# Patient Record
Sex: FEMALE | Race: WHITE | Employment: FULL TIME | URBAN - METROPOLITAN AREA
[De-identification: names, ages, dates, MRNs, and addresses within clinical notes are randomized per-mention and may not be internally consistent; named-entity substitution may affect disease eponyms.]

---

## 2018-08-13 LAB
HBV SURFACE AG SERPL QL IA: NEGATIVE
HIV 1+2 AB+HIV1 P24 AG SERPL QL IA: NEGATIVE
RUBELLA ABY IGG: NORMAL

## 2019-01-24 ENCOUNTER — ALLIED HEALTH/NURSE VISIT (OUTPATIENT)
Dept: EDUCATION SERVICES | Facility: CLINIC | Age: 29
End: 2019-01-24
Payer: COMMERCIAL

## 2019-01-24 DIAGNOSIS — O24.419 GESTATIONAL DIABETES: Primary | ICD-10-CM

## 2019-01-24 PROCEDURE — G0108 DIAB MANAGE TRN  PER INDIV: HCPCS

## 2019-01-24 RX ORDER — BLOOD-GLUCOSE METER
1 EACH MISCELLANEOUS ONCE
Qty: 1 KIT | Refills: 0 | Status: SHIPPED | OUTPATIENT
Start: 2019-01-24 | End: 2019-05-06

## 2019-01-24 NOTE — PROGRESS NOTES
Diabetes Self-Management Education & Support  Gestational Diabetes Self-Management Education & Support    SUBJECTIVE/OBJECTIVE:  Presents for education related to gestational diabetes.    Accompanied by: Self  Diabetes management related comments/concerns: Will the baby be okay?    Cultural Influences/Ethnic Background:  American    Estimated Date of Delivery: 19    1 hour OGTT  150  3 hour OGTT    Fasting  95  1 hour  174  2 hour  157  3 hour  122    Lifestyle and Health Behaviors:  Pre-pregnancy weight (lbs): 190 (29 weeks now)  Exercise:: Currently not exercising  Meals include: Breakfast, Lunch, Dinner, Snacks  Beverages: Water  Cultural/Sabianist diet restrictions?: No  Biggest challenges to healthy eating: None  Pre- vitamin?: Yes  Supplements?: No  Experiencing nausea?: No    Was doing weight watchers and had lost 40# prior to pregnancy.     Breakfast: english muffin and eggs and a small juliet and water  Snack: almonds and string cheese and carrots  Lunch: more protein and less carb (beef stew)  Snack: similar to morning or might be an apple  Dinner: protein usually (chicken) with salad and rice  Snack: nothing (not usually hungry at this time)    Healthy Coping:  Emotional response to diabetes: Ready to learn  Informal Support system:: Spouse  Stage of change: PREPARATION (Decided to change - considering how)    Current Management:  Taking medications for gestational diabetes?: No  Difficulty affording diabetes management supplies?: No    ASSESSMENT:  Needs assistance with meal plan and BG monitoring.     INTERVENTION:  Patient was instructed on One Touch Verio Flex meter and was able to provide an accurate return demonstration. Patient's blood glucose reading today was 120 mg/dL.    Educational topics covered today:  GDM diagnosis, pathophysiology, Risks and Complications of GDM, Means of controlling GDM, Using a Blood Glucose Monitor, Blood Glucose Goals, Logging and Interpreting Glucose  Results, Ketone Testing, When to Call a Diabetes Educator or OB Provider, Healthy Eating During Pregnancy, Counting Carbohydrates, Meal Planning for GDM, and Physical Activity    Educational materials provided today:   Artur Understanding Gestational Diabetes  GDM Log Book  Sharps Disposal  Care After Delivery  One Touch Verio Flex meter kit    Pt verbalized understanding of concepts discussed and recommendations provided today.     PLAN:  Check glucose 4 times daily, before breakfast and 1 hour after each meal.     Check Ketones daily for one week, if negative, reduce testing to once a week.     Physical activity recommended: as able.    Meal plan: 2-3 carbs at breakfast, 3-4 carbs at lunch, 3-4 carbs at supper, 1-2 carbs at 3 snacks a day.  Follow consistent CHO meal plan, eat CHO and protein/fat at all meals/snacks.    Call/e-mail/Break Mediahart message diabetes educator if 3 or more blood sugars are above the goal in 1 week, if ketones are positive, or with questions/concerns.    Yecenia Herron RD LD CDE    Time Spent: 90 minutes  Encounter Type: Individual

## 2019-02-08 ENCOUNTER — PATIENT OUTREACH (OUTPATIENT)
Dept: EDUCATION SERVICES | Facility: CLINIC | Age: 29
End: 2019-02-08

## 2019-02-08 NOTE — PROGRESS NOTES
Called pt to try to r/s her missed GDM follow up appointment from this week. No answer so left vm with our c/b number to schedule. Will call her clinic to update them.    Yecenia Herron RD LD CDE

## 2019-02-13 ENCOUNTER — ALLIED HEALTH/NURSE VISIT (OUTPATIENT)
Dept: EDUCATION SERVICES | Facility: CLINIC | Age: 29
End: 2019-02-13
Payer: COMMERCIAL

## 2019-02-13 DIAGNOSIS — O24.419 GESTATIONAL DIABETES: Primary | ICD-10-CM

## 2019-02-13 PROCEDURE — G0108 DIAB MANAGE TRN  PER INDIV: HCPCS

## 2019-02-13 NOTE — Clinical Note
Not sure who is covering triage tomorrow but I'm faxing my note to Dr. Campbell right now requesting insulin start and just wanted to keep it on the triage radar. Thanks for your help!Jeannine

## 2019-02-13 NOTE — PATIENT INSTRUCTIONS
1. Check glucose 4 times daily, before breakfast daily and 1 hour after each meal, as recommended.    2. Check ketones daily or once a week after they have been negative for 7 days in a row. If ketones are positive, let your diabetes educator know and continue to check daily until they are negative for 7 days in a row.    3. Continue with recommended physical activity.    4. Continue to follow recommended meal plan: 2-3 carbs at breakfast, 3-4 carbs at lunch, 3-4 carbs at supper, 1-2 carbs at snacks.  Follow consistent CHO meal plan, eat CHO and protein/fat at all meals/snacks.    5. Follow-up with OB doctor as recommended.    6. Call/e-mail diabetes educator if 3 or more blood sugars are above the goal in 1 week or if ketones are positive.    Jeannine or another Diabetes Educator will call with an update once we've heard back from your OB on insulin orders      AFTER YOU DELIVER:  - Continue with healthy eating and physical activity to get back to your pre-pregnancy weight.   - Have a follow-up 2-hour Glucose Tolerance Test at your 6-week post-partum check-up.   - Have your fasting blood sugar checked once a year.  - Plan ahead for future pregnancies - eat healthy, keep active, work with your doctor to check for gestational diabetes early on in the pregnancy and check blood sugars as recommended by your doctor.

## 2019-02-13 NOTE — PROGRESS NOTES
Diabetes Self-Management Education & Support  Gestational Diabetes Self-Management Education & Support    SUBJECTIVE/OBJECTIVE:  Presents for education related to gestational diabetes.    Accompanied by: (P) Self  Diabetes management related comments/concerns: (P) Will the baby be okay?    Cultural Influences/Ethnic Background:  American    There were no vitals taken for this visit.    Weight gain not assessed at 31 weeks gestation.    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   Meals include: (P) Breakfast, Lunch, Dinner, Snacks  Beverages: (P) Water  Cultural/Uatsdin diet restrictions?: (P) No  Biggest challenges to healthy eating: (P) None  Pre- vitamin?: (P) Yes  Supplements?: (P) No  Experiencing nausea?: (P) No        Healthy Coping:  Emotional response to diabetes: (P) Ready to learn  Informal Support system:: (P) Spouse  Stage of change: (P) PREPARATION (Decided to change - considering how)    Current Management:  Taking medications for gestational diabetes?: (P) No  Difficulty affording diabetes management supplies?: (P) No    ASSESSMENT:  Ketones: trace to small.   Fasting blood glucoses: 0% in target.  After breakfast: 46% in target.  After lunch: 93% in target.  After dinner: 79% in target.    Patient was able to verbalize that she sometimes ate too large of portions or made a poor food choice and that caused higher post-prandial blood sugars. She states she does not know why her morning blood sugars are always running well above goal. She is eating a bedtime snack only on occasion.     INTERVENTION:  Educational topics covered today:  What to expect after delivery, Future testing for Type 2 diabetes (2 hour OGTT at 6 week post-partum check-up and annual fasting blood glucose level), Risk of GDM and planning ahead for future pregnancies,  Recommended lifestyle interventions for reducing the risk of Type 2 Diabetes, When to Call a Diabetes Educator or OB Provider    Insulin injection technique taught using a Pen for NPH at bedtime. Patient verbalized understanding and was able to perform an accurate return demonstration of injection technique.  Discussed mixing insulin, storage, sharps, new needle each use, site selection, action of insulin and hypoglycemia signs, symptoms and treatment.      Patient states she is very fearful of needles and asked if there was a pill or another option so she did not have to take a shot. Informed her that insulin is what is generally considered safe and what we recommend during pregnancy for treatment of hyperglycemia. Recommended she discussed other options with her OB provider but if she did pursue an off-label use of oral medication, that we would no longer be able to follow her. Also encouraged her to consider asking her partner to help her with insulin injections as another option to avoid having to poke herself.       Educational Materials provided today:  Artur Preventing Diabetes    Provided Regine voucher good for 1 box of insulin pens    PLAN:  Check glucose 4 times daily.  Check ketones once a week when readings are consistently negative.   Continue with recommended physical activity.  Continue to follow recommended meal plan: 2-3 carbs at breakfast, 3-4 carbs at lunch, 3-4 carbs at supper, 1-2 carbs at snacks. Encouraged patient to focus on getting in HS snack and avoiding going over carb limits at meals to improve blood sugar.   Follow consistent CHO meal plan, eat CHO and protein/fat at all meals/snacks.  Recommend NPH at bedtime - 10 unit(s) (0.12 unit(s)/kg pre-pregnancy weight)  Send in blood sugar log on Monday or 3 days after insulin start     Call/e-mail/Survey[employer]ap message diabetes educator if 3 or more blood sugars are above the goal in 1 week or if ketones are positive.    Jeannine Ward, MART, LD    Time Spent: 40 minutes  Encounter Type: Individual    Any diabetes medication dose changes were made via the CDE Protocol and Collaborative Practice Agreement with the patient's OB/GYN provider. A copy of this encounter was shared with the provider.

## 2019-02-14 ENCOUNTER — PATIENT OUTREACH (OUTPATIENT)
Dept: EDUCATION SERVICES | Facility: CLINIC | Age: 29
End: 2019-02-14

## 2019-02-14 DIAGNOSIS — O24.419 GESTATIONAL DIABETES: Primary | ICD-10-CM

## 2019-02-14 NOTE — PROGRESS NOTES
Dr Iraida Strickland called to say she just had an appointment with pt who is agreeing to start insulin. MD gave verbal order to start 10 units at bedtime. Please send script to pharmacy.

## 2019-02-14 NOTE — PROGRESS NOTES
Called pt's OB clinic and talked to a triage nurse. Updated her that pt's fasting blood sugars are all high and that we recommend insulin to be started. However, pt is very fearful of needles and wondering about an oral option (see CDE note from yesterday for details). Pt is seeing her OB this morning and nurse will update her on our recommendations. Clinic will then call us back to update us on if the patient will do insulin or an oral medication so we can help order the insulin if needed.     Yecenia Herron, MART LD CDE

## 2019-02-14 NOTE — PROGRESS NOTES
Called patient and left voicemail informing patient that prescriptions were sent in this morning and she should likely be able to pick them up this afternoon and get started this evening. Informed her that she will need to use orange voucher (Regine voucher) provided yesterday to pay for pens. Requested patient e-mail blood sugar log on Monday. Provided Diabetes Ed triage line if patient has any further questions.     Jeannine Ward RD, LD

## 2019-02-14 NOTE — PROGRESS NOTES
NPH insulin ordered to pt's preferred pharmacy - 10 unit(s) at bedtime. Ordered insulin pen needles too (shortest available).    CDE that saw her yesterday will call to update her on orders placed.     Will request she e-mail us on Monday 2/18 for follow up.     MART Marroquin CDE

## 2019-02-18 ENCOUNTER — PATIENT OUTREACH (OUTPATIENT)
Dept: EDUCATION SERVICES | Facility: CLINIC | Age: 29
End: 2019-02-18
Payer: COMMERCIAL

## 2019-02-18 DIAGNOSIS — O24.419 GESTATIONAL DIABETES: ICD-10-CM

## 2019-02-18 NOTE — PROGRESS NOTES
Gestational Diabetes Follow-up    Subjective/Objective:    Stacey Bhandari sent in blood glucose log for review. Last date of communication was: 2/14/19.    Gestational diabetes is being managed with diet, activity and medications    Taking diabetes medications:   yes:     Diabetes Medication(s)     Insulin Sig    insulin isophane human (HUMULIN N KWIKPEN) 100 UNIT/ML injection Inject 10 units at bedtime.          Estimated Date of Delivery: 4/10/19    BG/Food Log:       Assessment:  Called clinic [facility] for updated body weight:  243 lbs (110.4 kg) 2/14/19    Current insulin dose is not high enough to bring fasting blood sugars down.  Patient may need twice daily insulin, but at this point anticipate post breakfast and lunch readings will come down once fasting readings are in target.  If post dinner elevations continue, recommend starting mealtime insulin.     Patient would benefit from a 0.1 unit(s)/kg insulin dose increase to 0.22 unit(s)/kg.  Given pattern of elevated lunch readings would recommend starting small dose of pre-breakfast Humulin to offer some daytime coverage.      Ketones: negative-small.   Fasting blood glucoses: 0% in target.  After breakfast: 25% in target.  After lunch: 75% in target.  After dinner: 50% in target.    Plan/Response:  Recommend increase to insulin - Humulin 0-0-0-10 --> 4-0-0-20.      Request orders to start mealtime insulin (novolog/humalog) if post meal readings elevations continue, recommend starting mealtime insulin up to 5 units per meal if 50% or more post meal readings are above target once fasting blood sugars are at goal.    Faxed insulin increase orders to [facility].    Liset Fabian MS, RD, LD, CDE      Any diabetes medication dose changes were made via the CDE Protocol and Collaborative Practice Agreement with the patient's OB/GYN provider. A copy of this encounter was shared with the provider.    E-mail reply to patient 2/18/19:  Merrick Ibarra,    Tim for sending  in your readings.  I think we started you on not quite enough insulin.  I have requested a larger dose increase from Dr. Campbell, but I won't hear back from her until tomorrow.    For tonight can you please increase to 12 units at bedtime.  Then I will email you tomorrow once the new insulin dose is approved.      Sorry about the delay!      Liset Fabian MS, RD, LD, CDE

## 2019-02-19 NOTE — PROGRESS NOTES
Fax received from [facility]:       E-mail to patient:  Merrick Ibarra,    Ok so for your bedtime dose tonight please increase to 20 units, this should better land your fasting readings in the 85-95 range.  Also due to the longer action time of the insulin any post breakfast readings that are in the 140-160s should also come down.      Then to cover some of the elevations during the day I think we should also add in some insulin during the day.  For now please take 4 units before breakfast.  If you just had breakfast you could take some now instead of waiting until tomorrow.    If after today's insulin increase you are still seeing some high after meal readings we can add in a 2nd kind of insulin, but it's best to see how your blood sugars respond with the kind of insulin you're currently on with the higher doses.    Can you send in again on Friday after breakfast?    Thanks!      Liset Fabian MS, RD, LD, CDE

## 2019-02-22 ENCOUNTER — PATIENT OUTREACH (OUTPATIENT)
Dept: EDUCATION SERVICES | Facility: CLINIC | Age: 29
End: 2019-02-22
Payer: COMMERCIAL

## 2019-02-22 DIAGNOSIS — O24.419 GESTATIONAL DIABETES: ICD-10-CM

## 2019-02-22 PROCEDURE — 99207 ZZC NO CHARGE LOS: CPT

## 2019-02-22 NOTE — PROGRESS NOTES
Gestational Diabetes Follow-up    Subjective/Objective:    Stacey BARAKAT Kaylanci sent in blood glucose log for review. Last date of communication was: 2/18/19.    Gestational diabetes is being managed with medications    Taking diabetes medications:   yes:     Diabetes Medication(s)     Insulin       insulin isophane human (HUMULIN N KWIKPEN) 100 UNIT/ML injection    Inject 4 units before breakfast and 20 units at bedtime.          Estimated Date of Delivery: Data Unavailable    BG/Food Log:       Hello!    Here are my numbers since upping my dosage in the 18th.     Let me know what you would like me to do... i promise I will try harder to get that bedtime snack in there I noticed the number looks like it went down the night I did that. Let me know what else I should do.    Thank you,  Stacey     Assessment:    Ketones: N-T.   Fasting blood glucoses: 0% in target.  After breakfast: 0% in target.  After lunch: 100% in target.  After dinner: 33% in target.    Per CDE note on 2/18, last updated body weight is 243 lbs (110.4 kg).     Called pt to confirm when she is taking her doses and her insulin injection technique. No answer so left vm for pt to call us back on our triage line. Also sent e-mail regarding this (see below).     Called OB clinic as well as I would recommend increasing her bedtime NPH further then our protocol allows if she is doing her insulin correctly. Discussed with RN who will talk to OB and call us back on our triage line with a verbal okay to increase after she talks to the OB. UPDATE - RN called back that Dr Campbell gave us a verbal okay to increase to 30 unit(s) NPH at bedtime and they will update their chart as well. Already have the okay from Dr Campbell to start 5 unit(s) meal time insulin if needed.     Plan/Response:  Recommend increase to insulin - NPH 4-0-0-20 --> NPH 4-0-0-30. Verbal okay from pt's OB Dr Campbell to do this.   If doing proper injection technique then recommend to start 5  "unit(s) novolog/humalog with breakfast. Asked pt to check with insurance to see which one is preferred.   Follow up on Monday after the post breakfast BG check.   E-mail sent as well as vm left on pt's phone to confirm she is doing proper insulin injection technique prior to ordering the short acting insulin with meals.     CDE Reply:   Merrick Ibarra,    I tried calling you this morning as I have just a couple questions for you but e-mail might be easier.  First, Thank you for sending in your numbers! Very helpful.    My question for you is are you taking 24 units insulin at bedtime or taking 20 units at bedtime and 4 units before breakfast?     My next question is related to how you are giving it. I just want to make sure you are following proper technique and receiving the full dose. Just a reminder to always use a new needle for an injection, \"test\" the needle with 2 units and make sure you see insulin come out before injecting yourself, and make sure you then PUSH the button on the end of the insulin pen all the way down to zero after you insert the needle into your body. Then just hold it there for about 5 seconds before removing the needle.  Also, make sure you gently blend your insulin before each dose (roll between your fingers for about 10 seconds).     IF You are taking 4 units before breakfast and 20 units before bed AND you feel confident you are doing the injection properly then we should increase your dose again.  I talked to your OB already and we will then increase to 30 units NPH at bedtime (and continue 4 units before breakfast). In this case, We also should start a new insulin with breakfast because it seems your blood sugar is just rising too much at this meal so we should start 5 units of a short acting insulin with this meal.     I would like to first hear from you on these questions before starting a new insulin though and increasing your current dose. So if you can get back to us via phone or " email before 2 pm today that would be very helpful.     Also, if you can call your insurance to see if they prefer either Novolog or Humalog that would be helpful so we order the correct one for you (this is the short acting insulin for meals)!    Lastly, your bedtime snack definitely does help your morning number so I would really try to get that each night.     Thank you so much and my apologies for such a long message!    Hope to hear from you soon.  Thanks!    MART Marroquin CDE      Any diabetes medication dose changes were made via the CDE Protocol and Collaborative Practice Agreement with the patient's OB/GYN provider. A copy of this encounter was shared with the provider.

## 2019-02-22 NOTE — PROGRESS NOTES
Pt sent e-mail reply:    Hello!    Ok so...    1. 20 units before bed and 4 after breakfast is what I have been doing.   2. I think you said 4 before now? Should I be doing 4 units before breakfast instead of after?   3. I have not been holding the needle in there for 5 seconds after... I will make sure to do that.  4. Yes I will have the bedtime snack today and see how that goes.     CDE response:     Thanks for the reply Stacey!    Great question! You should be doing 4 units NPH BEFORE breakfast (not after).      Since you are currently taking it after your meal, let's start with just adjusting that dose to before your meal and continue 4 units. We will hold off on the short acting insulin for now to see if that helps. If your after breakfast numbers continue to be too high this weekend with that change then we need to start some additional insulin at that meal.     It sounds like you must be doing the rest of the injection steps accurately but make sure you hold the pen button down and keep it under your skin after the injection for at least 5 seconds. This is to ensure you receive the full dose.     Let's plan to increase your insulin at bed tonight to the 30 units (and have that bedtime snack :))    Please let me know if you have any questions on this plan. Otherwise please send in your numbers again on Monday after you check your fasting blood sugar in the morning.     Have a nice weekend Stacey! Look forward to hearing from you next week.       MART Marroquin CDE

## 2019-02-22 NOTE — PROGRESS NOTES
Update:     Sent pt another e-mail reply after thinking about her numbers some more. Even if the NPH at bedtime with the increased dose brings down her FBS to target, her post breakfast rise that she is having will still keep her post meal numbers above target. Therefore, will also order the 5 unit(s) humalog with this meal.     Hi there again,     After reviewing the plan a bit more I just don't think the 4 units in the morning will touch that breakfast meal rise since it does not start working right away. So I am going to order a short acting insulin this afternoon for you as well and that dose will be 5 units prior to breakfast (take 5-10 min before breakfast). You will also still take the 4 units of NPH insulin prior to breakfast (2 injections I know. I am sorry!). I still want you to update us on Monday please! Thanks and let me know if you have questions. Sorry for so many messages today!    Yecenia Herron, MART MCCAULEY CDE

## 2019-02-28 ENCOUNTER — PATIENT OUTREACH (OUTPATIENT)
Dept: EDUCATION SERVICES | Facility: CLINIC | Age: 29
End: 2019-02-28
Payer: COMMERCIAL

## 2019-02-28 DIAGNOSIS — O24.419 GESTATIONAL DIABETES: ICD-10-CM

## 2019-02-28 NOTE — PROGRESS NOTES
Gestational Diabetes Follow-up    Subjective/Objective:    Stacey BARAKAT Zackekci sent in blood glucose log for review. Last date of communication was: 2/22.    Gestational diabetes is being managed with medications    Taking diabetes medications:   yes:     Diabetes Medication(s)     Insulin       insulin isophane human (HUMULIN N KWIKPEN) 100 UNIT/ML injection    Inject 4 units before breakfast and 30 units at bedtime.     insulin lispro (HUMALOG PEN) 100 UNIT/ML pen    Inject 5 units  with breakfast meal daily.        NOTE: It appears she took the recommended 30 units for only 2 nights, then increased to 34 units (no documentation of rec to increase to 34).     Estimated Date of Delivery: 4/11  BG/Food Log:         Assessment:(since insulin increase)    Ketones: N.   Fasting blood glucoses: 0% in target.  After breakfast: 100% in target.  After lunch: 80% in target.  After dinner: 60% in target.    Plan/Response:  Recommend increase to insulin - Increase NPH from 4-0-0-34 --> 5-0-0-39 (<20% increase in TDD, 15% at HS).  Continue 5 units Humalog before breakfast.     Requested pre- and post- numbers with each meal for now.     CDE reply:  Merrick Ibarra,  Thank you for the update! You are doing just great with record keeping! Very easy to see how your body is using the insulin and food.     It looks like you have some powerful mom hormones J Great for baby, but hard on blood sugars.     ~ Frist, lets continue to work to get the morning number down. Please increase your bedtime NPH insulin (from 34) to 39 units.   ~ For now, also please increase the morning NPH insulin (from 4) to 5 units.   ~ Finally, I believe you are taking 5 units of Humalog insulin right before breakfast- that is working great! Keep it the same.     One request, I know it s a pain- if you could check before and 1 hour after each meal for a week or so, that would be ideal.   Comparing the numbers before and after lunch and dinner can help to show whether  you just need more NPH in the morning, or if your body is really asking for some Humalog right before each meal.   Your body is SO sensitive to everything right now! With those couple of higher numbers after dinner, it would be good to know what s up.     To check before and after, just put it in the box that says  after lunch  (or dinner) like: 94/141 (before/after)    If you can make the insulin increases and send in again Monday, Liset can take a look and see how that works ;)    Thank you Stacey!   Genevieve Marlow RD, LD, CDE    Any diabetes medication dose changes were made via the CDE Protocol and Collaborative Practice Agreement with the patient's OB/GYN provider.

## 2019-03-06 ENCOUNTER — PATIENT OUTREACH (OUTPATIENT)
Dept: EDUCATION SERVICES | Facility: CLINIC | Age: 29
End: 2019-03-06
Payer: COMMERCIAL

## 2019-03-06 DIAGNOSIS — O24.419 GESTATIONAL DIABETES: ICD-10-CM

## 2019-03-06 LAB — GROUP B STREP PCR: POSITIVE

## 2019-03-06 NOTE — PROGRESS NOTES
Gestational Diabetes Follow-up    Subjective/Objective:    Stacey BARAKAT Zackekci sent in blood glucose log for review. Last date of communication was: 2/28/19.    Gestational diabetes is being managed with medications    Taking diabetes medications:   yes:     Diabetes Medication(s)     Insulin       insulin isophane human (HUMULIN N KWIKPEN) 100 UNIT/ML injection    Inject 5 units before breakfast and 39 units at bedtime.     insulin lispro (HUMALOG PEN) 100 UNIT/ML pen    Inject 5 units  with breakfast meal daily.          Estimated Date of Delivery: Data Unavailable    BG/Food Log:       Assessment:    Ketones: neg.   Fasting blood glucoses: 43% in target.  After breakfast: 66% in target (believe she is checking 1 hour after breakfast instead of 2 hours)  After lunch: 100% in target.  After dinner: 83% in target.    Patient is only checking at 1 hour after breakfast, but should move to 2 hours since she is taking short acting insulin at that time.     Plan/Response:  Recommend increase to insulin - NPH 4-0-0-39 ---> 4-0-0-45 (16% increase).   Will not increase Humalog today, however want to see her numbers again Monday for review on breakfast numbers once she is checking at 2 hours post meal    Hi Stacey,    Thanks for the update! Overall, I think the 4 units of NPH in the morning is working well for you, so no changes to that dose! Let's make the following changes today:  1. Increase your bedtime NPH to 45 units (from 39)  2. Can you start checking your after breakfast number at 2 hours instead of 1? I know that can sound picky, but since you are taking short acting insulin at that time, the 2 hour check is the best one to see how that insulin is working for you. Just place an * after your breakfast number so that we know you checked at 2 hours. For now, let's keep that HUmalog dose at 5 units before breakfast.  3. You can reduce your ketone checks to once weekly now   4. Email us Monday morning for another  review.    Thanks, and let me know if you have any questions!    Kavitha Guevara RD LD CDE    Any diabetes medication dose changes were made via the CDE Protocol and Collaborative Practice Agreement with the patient's OB/GYN provider. A copy of this encounter was shared with the provider.

## 2019-03-13 ENCOUNTER — PATIENT OUTREACH (OUTPATIENT)
Dept: EDUCATION SERVICES | Facility: CLINIC | Age: 29
End: 2019-03-13

## 2019-03-13 DIAGNOSIS — O24.419 GESTATIONAL DIABETES: Primary | ICD-10-CM

## 2019-03-13 NOTE — PROGRESS NOTES
Gestational Diabetes Follow-up    Subjective/Objective:    Stacey YUVAL Bhandari sent in blood glucose log for review. Last date of communication was: 3/6/19.    Gestational diabetes is being managed with medications    Taking diabetes medications:   yes:     Diabetes Medication(s)     Insulin       insulin isophane human (HUMULIN N KWIKPEN) 100 UNIT/ML injection    Inject 5 units before breakfast and 45 units at bedtime.     insulin lispro (HUMALOG PEN) 100 UNIT/ML pen    Inject 5 units  with breakfast meal daily.          Estimated Date of Delivery: Data Unavailable    BG/Food Log:       Assessment:    Ketones: neg.   Fasting blood glucoses: 83% in target.  After breakfast: 100% in target.  After lunch: 100% in target.  After dinner: 80% in target.    Plan/Response:  No changes in the patient's current treatment plan.  Follow-up on Monday.    Merrick Ibarra,    Thanks for the update! I think we can keep your insulin doses the same for right now - things are looking good! Your fasting this morning was a little out of target, but it is not enough for me to want to make a dose change yet. If you do notice that your fasting number is starting to stay >95 by Friday, I am ok with you increasing to 50 units before bed. Otherwise, just keep logging your blood sugars and we ll take another look at things on Monday morning.    Have a great week!  MART Hand CDE    Any diabetes medication dose changes were made via the CDE Protocol and Collaborative Practice Agreement with the patient's OB/GYN provider. A copy of this encounter was shared with the provider.

## 2019-03-18 ENCOUNTER — PATIENT OUTREACH (OUTPATIENT)
Dept: EDUCATION SERVICES | Facility: CLINIC | Age: 29
End: 2019-03-18
Payer: COMMERCIAL

## 2019-03-18 DIAGNOSIS — O24.419 GESTATIONAL DIABETES: ICD-10-CM

## 2019-03-18 NOTE — PROGRESS NOTES
E-mail reply from patient:  I will need a refill of the insulin I m taking at night. I am almost out. Also, I am needing more of the lancets and test strips as well.     Also, I haven t been taking anything during the day. Only the 45 at night. Was I supposed to be taking something else too? My blood sugar seems to be pretty good during the day it s just the fasting numbers that seem off.     E-mail reply to patient:  Hi Stacey,    Thanks for letting me know about your supplies.  I just sent a 1 month supply for strips and lancets to the pharmacy.      We are not authorized to refill insulin unfortunately.  I would recommend requesting a new order today first from the pharmacy.  The should then request a refill from Dr. Campbell.  We have been updating the pharmacy as we adjust your dose so you should be able to get a refill for the 50 units.  If you aren't able to get through to the pharmacy I would call Dr. Henry's[facility] today to let them know you'll need a refill.    If your insurance doesn't cover Humulin Kwikpens, we may need to have you switch to the vial and syringe for the remainder of the pregnancy.      Sorry about the confusion on the daytime insulin.  Typically when people take mealtime insulin we have them check 2 hours after the meal, and since we ordered the mealtime insulin a while back but then had you hold of on taking it, I just looked through things too fast!  Continue just taking the Humulin at bedtime, but increase to 50 units.    Let me know tomorrow if you have any issues getting a refil.      Liset Fabian MS, RD, LD, CDE

## 2019-03-18 NOTE — PROGRESS NOTES
Gestational Diabetes Follow-up    Subjective/Objective:    Stacey BARAKAT Ipekci sent in blood glucose log for review. Last date of communication was: 3/13/19.    Gestational diabetes is being managed with diet, activity and medications    Taking diabetes medications:   yes:     Diabetes Medication(s)     Insulin       insulin isophane human (HUMULIN N KWIKPEN) 100 UNIT/ML injection    Inject 5 units before breakfast and 45 units at bedtime.     insulin lispro (HUMALOG PEN) 100 UNIT/ML pen    Inject 5 units  with breakfast meal daily.          Estimated Date of Delivery: 4/11/19    BG/Food Log:       Assessment:  Fasting and post breakfast readings increasing to be above target.  Anticipate post breakfast readings will come back into target once fasting reading is improved.     Ketones: negative.   Fasting blood glucoses: 40% in target.  After breakfast: 80% in target.  After lunch: 100% in target.  After dinner: 100% in target.    Plan/Response:  Recommend increase to insulin - Humulin 5-0-0-45 --> 5-0-0-50.  If fasting readings are in target, and post breakfast readings increase above target, then increase Humalog 5-0-0-0- --> 6-0-0-0  Follow up 3/21/19    Liset Fabian MS, RD, LD, CDE      Any diabetes medication dose changes were made via the CDE Protocol and Collaborative Practice Agreement with the patient's OB/GYN provider. A copy of this encounter was shared with the provider.    E-mail reply to patient:  Merrick Ibarra,    Thanks for sending in your readings.  Please increase to 50 units at bedtime of Humulin.  If you fasting readings come back into 95 or less and your after breakfast readings are still above 120, then increase to 6 units of the Humalog.    Can you send in again on Thursday 3/21/19.    Thanks!      Liset Fabian MS, RD, LD, CDE

## 2019-03-22 ENCOUNTER — HOSPITAL ENCOUNTER (INPATIENT)
Facility: CLINIC | Age: 29
LOS: 3 days | Discharge: HOME | End: 2019-03-25
Attending: OBSTETRICS & GYNECOLOGY | Admitting: OBSTETRICS & GYNECOLOGY
Payer: COMMERCIAL

## 2019-03-22 LAB
ABO + RH BLD: NORMAL
ABO + RH BLD: NORMAL
GLUCOSE BLDC GLUCOMTR-MCNC: 133 MG/DL (ref 70–99)
SPECIMEN EXP DATE BLD: NORMAL

## 2019-03-22 PROCEDURE — 0064U ANTB TP TOTAL&RPR IA QUAL: CPT | Performed by: OBSTETRICS & GYNECOLOGY

## 2019-03-22 PROCEDURE — 3E0P7VZ INTRODUCTION OF HORMONE INTO FEMALE REPRODUCTIVE, VIA NATURAL OR ARTIFICIAL OPENING: ICD-10-PCS | Performed by: OBSTETRICS & GYNECOLOGY

## 2019-03-22 PROCEDURE — 25000131 ZZH RX MED GY IP 250 OP 636 PS 637: Performed by: OBSTETRICS & GYNECOLOGY

## 2019-03-22 PROCEDURE — 00000146 ZZHCL STATISTIC GLUCOSE BY METER IP

## 2019-03-22 PROCEDURE — 86901 BLOOD TYPING SEROLOGIC RH(D): CPT | Performed by: OBSTETRICS & GYNECOLOGY

## 2019-03-22 PROCEDURE — 25000132 ZZH RX MED GY IP 250 OP 250 PS 637: Performed by: OBSTETRICS & GYNECOLOGY

## 2019-03-22 PROCEDURE — 10907ZC DRAINAGE OF AMNIOTIC FLUID, THERAPEUTIC FROM PRODUCTS OF CONCEPTION, VIA NATURAL OR ARTIFICIAL OPENING: ICD-10-PCS | Performed by: OBSTETRICS & GYNECOLOGY

## 2019-03-22 PROCEDURE — 86900 BLOOD TYPING SEROLOGIC ABO: CPT | Performed by: OBSTETRICS & GYNECOLOGY

## 2019-03-22 PROCEDURE — 12000000 ZZH R&B MED SURG/OB

## 2019-03-22 PROCEDURE — 3E033VJ INTRODUCTION OF OTHER HORMONE INTO PERIPHERAL VEIN, PERCUTANEOUS APPROACH: ICD-10-PCS | Performed by: OBSTETRICS & GYNECOLOGY

## 2019-03-22 PROCEDURE — 36415 COLL VENOUS BLD VENIPUNCTURE: CPT | Performed by: OBSTETRICS & GYNECOLOGY

## 2019-03-22 RX ORDER — DEXTROSE MONOHYDRATE 25 G/50ML
25-50 INJECTION, SOLUTION INTRAVENOUS
Status: DISCONTINUED | OUTPATIENT
Start: 2019-03-22 | End: 2019-03-23

## 2019-03-22 RX ORDER — FENTANYL CITRATE 50 UG/ML
50-100 INJECTION, SOLUTION INTRAMUSCULAR; INTRAVENOUS
Status: DISCONTINUED | OUTPATIENT
Start: 2019-03-22 | End: 2019-03-23

## 2019-03-22 RX ORDER — OXYTOCIN 10 [USP'U]/ML
10 INJECTION, SOLUTION INTRAMUSCULAR; INTRAVENOUS
Status: DISCONTINUED | OUTPATIENT
Start: 2019-03-22 | End: 2019-03-23

## 2019-03-22 RX ORDER — SODIUM CHLORIDE, SODIUM LACTATE, POTASSIUM CHLORIDE, CALCIUM CHLORIDE 600; 310; 30; 20 MG/100ML; MG/100ML; MG/100ML; MG/100ML
INJECTION, SOLUTION INTRAVENOUS CONTINUOUS
Status: DISCONTINUED | OUTPATIENT
Start: 2019-03-22 | End: 2019-03-23

## 2019-03-22 RX ORDER — TERBUTALINE SULFATE 1 MG/ML
0.25 INJECTION, SOLUTION SUBCUTANEOUS
Status: DISCONTINUED | OUTPATIENT
Start: 2019-03-22 | End: 2019-03-23

## 2019-03-22 RX ORDER — OXYTOCIN/0.9 % SODIUM CHLORIDE 30/500 ML
100-340 PLASTIC BAG, INJECTION (ML) INTRAVENOUS CONTINUOUS PRN
Status: COMPLETED | OUTPATIENT
Start: 2019-03-22 | End: 2019-03-23

## 2019-03-22 RX ORDER — NALOXONE HYDROCHLORIDE 0.4 MG/ML
.1-.4 INJECTION, SOLUTION INTRAMUSCULAR; INTRAVENOUS; SUBCUTANEOUS
Status: DISCONTINUED | OUTPATIENT
Start: 2019-03-22 | End: 2019-03-23

## 2019-03-22 RX ORDER — CARBOPROST TROMETHAMINE 250 UG/ML
250 INJECTION, SOLUTION INTRAMUSCULAR
Status: DISCONTINUED | OUTPATIENT
Start: 2019-03-22 | End: 2019-03-23

## 2019-03-22 RX ORDER — MISOPROSTOL 100 UG/1
25 TABLET ORAL EVERY 4 HOURS PRN
Status: DISCONTINUED | OUTPATIENT
Start: 2019-03-22 | End: 2019-03-23

## 2019-03-22 RX ORDER — IBUPROFEN 400 MG/1
800 TABLET, FILM COATED ORAL
Status: DISCONTINUED | OUTPATIENT
Start: 2019-03-22 | End: 2019-03-23

## 2019-03-22 RX ORDER — NICOTINE POLACRILEX 4 MG
15-30 LOZENGE BUCCAL
Status: DISCONTINUED | OUTPATIENT
Start: 2019-03-22 | End: 2019-03-23

## 2019-03-22 RX ORDER — ACETAMINOPHEN 325 MG/1
650 TABLET ORAL EVERY 4 HOURS PRN
Status: DISCONTINUED | OUTPATIENT
Start: 2019-03-22 | End: 2019-03-23

## 2019-03-22 RX ORDER — SODIUM CHLORIDE 9 MG/ML
INJECTION, SOLUTION INTRAVENOUS CONTINUOUS
Status: DISCONTINUED | OUTPATIENT
Start: 2019-03-22 | End: 2019-03-23

## 2019-03-22 RX ORDER — METHYLERGONOVINE MALEATE 0.2 MG/ML
200 INJECTION INTRAVENOUS
Status: DISCONTINUED | OUTPATIENT
Start: 2019-03-22 | End: 2019-03-23

## 2019-03-22 RX ORDER — DEXTROSE, SODIUM CHLORIDE, SODIUM LACTATE, POTASSIUM CHLORIDE, AND CALCIUM CHLORIDE 5; .6; .31; .03; .02 G/100ML; G/100ML; G/100ML; G/100ML; G/100ML
INJECTION, SOLUTION INTRAVENOUS CONTINUOUS
Status: DISCONTINUED | OUTPATIENT
Start: 2019-03-22 | End: 2019-03-23

## 2019-03-22 RX ORDER — ONDANSETRON 2 MG/ML
4 INJECTION INTRAMUSCULAR; INTRAVENOUS EVERY 6 HOURS PRN
Status: DISCONTINUED | OUTPATIENT
Start: 2019-03-22 | End: 2019-03-23

## 2019-03-22 RX ORDER — PENICILLIN G POTASSIUM 5000000 [IU]/1
5 INJECTION, POWDER, FOR SOLUTION INTRAMUSCULAR; INTRAVENOUS ONCE
Status: COMPLETED | OUTPATIENT
Start: 2019-03-22 | End: 2019-03-23

## 2019-03-22 RX ORDER — OXYCODONE AND ACETAMINOPHEN 5; 325 MG/1; MG/1
1 TABLET ORAL
Status: DISCONTINUED | OUTPATIENT
Start: 2019-03-22 | End: 2019-03-23

## 2019-03-22 RX ADMIN — Medication 25 MCG: at 20:47

## 2019-03-22 RX ADMIN — INSULIN HUMAN 50 UNITS: 100 INJECTION, SUSPENSION SUBCUTANEOUS at 22:08

## 2019-03-23 ENCOUNTER — ANESTHESIA EVENT (OUTPATIENT)
Dept: OBGYN | Facility: CLINIC | Age: 29
End: 2019-03-23
Payer: COMMERCIAL

## 2019-03-23 ENCOUNTER — ANESTHESIA (OUTPATIENT)
Dept: OBGYN | Facility: CLINIC | Age: 29
End: 2019-03-23
Payer: COMMERCIAL

## 2019-03-23 LAB
ALT SERPL W P-5'-P-CCNC: 18 U/L (ref 0–50)
AST SERPL W P-5'-P-CCNC: 22 U/L (ref 0–45)
CREAT SERPL-MCNC: 0.57 MG/DL (ref 0.52–1.04)
ERYTHROCYTE [DISTWIDTH] IN BLOOD BY AUTOMATED COUNT: 13.8 % (ref 10–15)
GFR SERPL CREATININE-BSD FRML MDRD: >90 ML/MIN/{1.73_M2}
GLUCOSE BLDC GLUCOMTR-MCNC: 103 MG/DL (ref 70–99)
GLUCOSE BLDC GLUCOMTR-MCNC: 110 MG/DL (ref 70–99)
GLUCOSE BLDC GLUCOMTR-MCNC: 112 MG/DL (ref 70–99)
GLUCOSE BLDC GLUCOMTR-MCNC: 113 MG/DL (ref 70–99)
GLUCOSE BLDC GLUCOMTR-MCNC: 154 MG/DL (ref 70–99)
GLUCOSE BLDC GLUCOMTR-MCNC: 99 MG/DL (ref 70–99)
HCT VFR BLD AUTO: 37.3 % (ref 35–47)
HGB BLD-MCNC: 12.3 G/DL (ref 11.7–15.7)
MCH RBC QN AUTO: 26.9 PG (ref 26.5–33)
MCHC RBC AUTO-ENTMCNC: 33 G/DL (ref 31.5–36.5)
MCV RBC AUTO: 81 FL (ref 78–100)
PLATELET # BLD AUTO: 214 10E9/L (ref 150–450)
RBC # BLD AUTO: 4.58 10E12/L (ref 3.8–5.2)
T PALLIDUM AB SER QL: NONREACTIVE
WBC # BLD AUTO: 19.7 10E9/L (ref 4–11)

## 2019-03-23 PROCEDURE — 27110038 ZZH RX 271: Performed by: ANESTHESIOLOGY

## 2019-03-23 PROCEDURE — 00000146 ZZHCL STATISTIC GLUCOSE BY METER IP

## 2019-03-23 PROCEDURE — 37000011 ZZH ANESTHESIA WARD SERVICE

## 2019-03-23 PROCEDURE — 72200001 ZZH LABOR CARE VAGINAL DELIVERY SINGLE

## 2019-03-23 PROCEDURE — 88307 TISSUE EXAM BY PATHOLOGIST: CPT | Performed by: OBSTETRICS & GYNECOLOGY

## 2019-03-23 PROCEDURE — 84450 TRANSFERASE (AST) (SGOT): CPT | Performed by: OBSTETRICS & GYNECOLOGY

## 2019-03-23 PROCEDURE — 25000128 H RX IP 250 OP 636: Performed by: OBSTETRICS & GYNECOLOGY

## 2019-03-23 PROCEDURE — 84460 ALANINE AMINO (ALT) (SGPT): CPT | Performed by: OBSTETRICS & GYNECOLOGY

## 2019-03-23 PROCEDURE — 82565 ASSAY OF CREATININE: CPT | Performed by: OBSTETRICS & GYNECOLOGY

## 2019-03-23 PROCEDURE — 36415 COLL VENOUS BLD VENIPUNCTURE: CPT | Performed by: OBSTETRICS & GYNECOLOGY

## 2019-03-23 PROCEDURE — 25000125 ZZHC RX 250: Performed by: OBSTETRICS & GYNECOLOGY

## 2019-03-23 PROCEDURE — 25000132 ZZH RX MED GY IP 250 OP 250 PS 637: Performed by: OBSTETRICS & GYNECOLOGY

## 2019-03-23 PROCEDURE — 25800030 ZZH RX IP 258 OP 636: Performed by: OBSTETRICS & GYNECOLOGY

## 2019-03-23 PROCEDURE — 25000125 ZZHC RX 250: Performed by: ANESTHESIOLOGY

## 2019-03-23 PROCEDURE — 25000128 H RX IP 250 OP 636: Performed by: ANESTHESIOLOGY

## 2019-03-23 PROCEDURE — 0UQMXZZ REPAIR VULVA, EXTERNAL APPROACH: ICD-10-PCS | Performed by: OBSTETRICS & GYNECOLOGY

## 2019-03-23 PROCEDURE — 85027 COMPLETE CBC AUTOMATED: CPT | Performed by: OBSTETRICS & GYNECOLOGY

## 2019-03-23 PROCEDURE — 3E0R3BZ INTRODUCTION OF ANESTHETIC AGENT INTO SPINAL CANAL, PERCUTANEOUS APPROACH: ICD-10-PCS | Performed by: ANESTHESIOLOGY

## 2019-03-23 PROCEDURE — 25000131 ZZH RX MED GY IP 250 OP 636 PS 637: Performed by: OBSTETRICS & GYNECOLOGY

## 2019-03-23 PROCEDURE — 12000035 ZZH R&B POSTPARTUM

## 2019-03-23 PROCEDURE — 0KQM0ZZ REPAIR PERINEUM MUSCLE, OPEN APPROACH: ICD-10-PCS | Performed by: OBSTETRICS & GYNECOLOGY

## 2019-03-23 PROCEDURE — 00HU33Z INSERTION OF INFUSION DEVICE INTO SPINAL CANAL, PERCUTANEOUS APPROACH: ICD-10-PCS | Performed by: ANESTHESIOLOGY

## 2019-03-23 PROCEDURE — 88307 TISSUE EXAM BY PATHOLOGIST: CPT | Mod: 26 | Performed by: OBSTETRICS & GYNECOLOGY

## 2019-03-23 RX ORDER — ONDANSETRON 4 MG/1
4 TABLET, ORALLY DISINTEGRATING ORAL EVERY 6 HOURS PRN
Status: DISCONTINUED | OUTPATIENT
Start: 2019-03-23 | End: 2019-03-23

## 2019-03-23 RX ORDER — OXYTOCIN/0.9 % SODIUM CHLORIDE 30/500 ML
1-24 PLASTIC BAG, INJECTION (ML) INTRAVENOUS CONTINUOUS
Status: DISCONTINUED | OUTPATIENT
Start: 2019-03-23 | End: 2019-03-23

## 2019-03-23 RX ORDER — OXYTOCIN/0.9 % SODIUM CHLORIDE 30/500 ML
340 PLASTIC BAG, INJECTION (ML) INTRAVENOUS CONTINUOUS PRN
Status: DISCONTINUED | OUTPATIENT
Start: 2019-03-23 | End: 2019-03-25 | Stop reason: HOSPADM

## 2019-03-23 RX ORDER — ACETAMINOPHEN 325 MG/1
650 TABLET ORAL EVERY 4 HOURS PRN
Status: DISCONTINUED | OUTPATIENT
Start: 2019-03-23 | End: 2019-03-25 | Stop reason: HOSPADM

## 2019-03-23 RX ORDER — HYDROCORTISONE 2.5 %
CREAM (GRAM) TOPICAL 3 TIMES DAILY PRN
Status: DISCONTINUED | OUTPATIENT
Start: 2019-03-23 | End: 2019-03-25 | Stop reason: HOSPADM

## 2019-03-23 RX ORDER — AMOXICILLIN 250 MG
1 CAPSULE ORAL 2 TIMES DAILY
Status: DISCONTINUED | OUTPATIENT
Start: 2019-03-23 | End: 2019-03-25 | Stop reason: HOSPADM

## 2019-03-23 RX ORDER — BISACODYL 10 MG
10 SUPPOSITORY, RECTAL RECTAL DAILY PRN
Status: DISCONTINUED | OUTPATIENT
Start: 2019-03-25 | End: 2019-03-25 | Stop reason: HOSPADM

## 2019-03-23 RX ORDER — OXYTOCIN/0.9 % SODIUM CHLORIDE 30/500 ML
100 PLASTIC BAG, INJECTION (ML) INTRAVENOUS CONTINUOUS
Status: DISCONTINUED | OUTPATIENT
Start: 2019-03-23 | End: 2019-03-25 | Stop reason: HOSPADM

## 2019-03-23 RX ORDER — SODIUM CHLORIDE 9 MG/ML
INJECTION, SOLUTION INTRAVENOUS CONTINUOUS
Status: DISCONTINUED | OUTPATIENT
Start: 2019-03-23 | End: 2019-03-23

## 2019-03-23 RX ORDER — MISOPROSTOL 200 UG/1
800 TABLET ORAL
Status: DISCONTINUED | OUTPATIENT
Start: 2019-03-23 | End: 2019-03-25 | Stop reason: HOSPADM

## 2019-03-23 RX ORDER — ROPIVACAINE HYDROCHLORIDE 2 MG/ML
10 INJECTION, SOLUTION EPIDURAL; INFILTRATION; PERINEURAL ONCE
Status: COMPLETED | OUTPATIENT
Start: 2019-03-23 | End: 2019-03-23

## 2019-03-23 RX ORDER — LIDOCAINE HYDROCHLORIDE AND EPINEPHRINE 15; 5 MG/ML; UG/ML
3 INJECTION, SOLUTION EPIDURAL
Status: COMPLETED | OUTPATIENT
Start: 2019-03-23 | End: 2019-03-23

## 2019-03-23 RX ORDER — NALOXONE HYDROCHLORIDE 0.4 MG/ML
.1-.4 INJECTION, SOLUTION INTRAMUSCULAR; INTRAVENOUS; SUBCUTANEOUS
Status: DISCONTINUED | OUTPATIENT
Start: 2019-03-23 | End: 2019-03-25 | Stop reason: HOSPADM

## 2019-03-23 RX ORDER — AMOXICILLIN 250 MG
2 CAPSULE ORAL 2 TIMES DAILY
Status: DISCONTINUED | OUTPATIENT
Start: 2019-03-23 | End: 2019-03-25 | Stop reason: HOSPADM

## 2019-03-23 RX ORDER — CARBOPROST TROMETHAMINE 250 UG/ML
250 INJECTION, SOLUTION INTRAMUSCULAR
Status: DISCONTINUED | OUTPATIENT
Start: 2019-03-23 | End: 2019-03-25 | Stop reason: HOSPADM

## 2019-03-23 RX ORDER — NICOTINE POLACRILEX 4 MG
15-30 LOZENGE BUCCAL
Status: DISCONTINUED | OUTPATIENT
Start: 2019-03-23 | End: 2019-03-25 | Stop reason: HOSPADM

## 2019-03-23 RX ORDER — OXYTOCIN 10 [USP'U]/ML
10 INJECTION, SOLUTION INTRAMUSCULAR; INTRAVENOUS
Status: DISCONTINUED | OUTPATIENT
Start: 2019-03-23 | End: 2019-03-25 | Stop reason: HOSPADM

## 2019-03-23 RX ORDER — LIDOCAINE 40 MG/G
CREAM TOPICAL
Status: DISCONTINUED | OUTPATIENT
Start: 2019-03-23 | End: 2019-03-23

## 2019-03-23 RX ORDER — IBUPROFEN 400 MG/1
800 TABLET, FILM COATED ORAL EVERY 6 HOURS PRN
Status: DISCONTINUED | OUTPATIENT
Start: 2019-03-23 | End: 2019-03-25 | Stop reason: HOSPADM

## 2019-03-23 RX ORDER — OXYCODONE HYDROCHLORIDE 5 MG/1
5 TABLET ORAL EVERY 4 HOURS PRN
Status: DISCONTINUED | OUTPATIENT
Start: 2019-03-23 | End: 2019-03-25 | Stop reason: HOSPADM

## 2019-03-23 RX ORDER — DEXTROSE MONOHYDRATE 25 G/50ML
25-50 INJECTION, SOLUTION INTRAVENOUS
Status: DISCONTINUED | OUTPATIENT
Start: 2019-03-23 | End: 2019-03-25 | Stop reason: HOSPADM

## 2019-03-23 RX ORDER — LANOLIN 100 %
OINTMENT (GRAM) TOPICAL
Status: DISCONTINUED | OUTPATIENT
Start: 2019-03-23 | End: 2019-03-25 | Stop reason: HOSPADM

## 2019-03-23 RX ORDER — NALOXONE HYDROCHLORIDE 0.4 MG/ML
.1-.4 INJECTION, SOLUTION INTRAMUSCULAR; INTRAVENOUS; SUBCUTANEOUS
Status: DISCONTINUED | OUTPATIENT
Start: 2019-03-23 | End: 2019-03-23

## 2019-03-23 RX ORDER — EPHEDRINE SULFATE 50 MG/ML
5 INJECTION, SOLUTION INTRAMUSCULAR; INTRAVENOUS; SUBCUTANEOUS
Status: DISCONTINUED | OUTPATIENT
Start: 2019-03-23 | End: 2019-03-23

## 2019-03-23 RX ORDER — ONDANSETRON 2 MG/ML
4 INJECTION INTRAMUSCULAR; INTRAVENOUS EVERY 6 HOURS PRN
Status: DISCONTINUED | OUTPATIENT
Start: 2019-03-23 | End: 2019-03-23

## 2019-03-23 RX ORDER — NALBUPHINE HYDROCHLORIDE 10 MG/ML
2.5-5 INJECTION, SOLUTION INTRAMUSCULAR; INTRAVENOUS; SUBCUTANEOUS EVERY 6 HOURS PRN
Status: DISCONTINUED | OUTPATIENT
Start: 2019-03-23 | End: 2019-03-23

## 2019-03-23 RX ADMIN — SODIUM CHLORIDE: 9 INJECTION, SOLUTION INTRAVENOUS at 14:16

## 2019-03-23 RX ADMIN — SODIUM CHLORIDE, SODIUM LACTATE, POTASSIUM CHLORIDE, CALCIUM CHLORIDE AND DEXTROSE MONOHYDRATE: 5; 600; 310; 30; 20 INJECTION, SOLUTION INTRAVENOUS at 11:43

## 2019-03-23 RX ADMIN — FENTANYL CITRATE 100 MCG: 50 INJECTION, SOLUTION INTRAMUSCULAR; INTRAVENOUS at 10:04

## 2019-03-23 RX ADMIN — OXYTOCIN-SODIUM CHLORIDE 0.9% IV SOLN 30 UNIT/500ML 340 ML/HR: 30-0.9/5 SOLUTION at 15:24

## 2019-03-23 RX ADMIN — ROPIVACAINE HYDROCHLORIDE 10 ML: 2 INJECTION, SOLUTION EPIDURAL; INFILTRATION at 11:30

## 2019-03-23 RX ADMIN — SODIUM CHLORIDE, POTASSIUM CHLORIDE, SODIUM LACTATE AND CALCIUM CHLORIDE: 600; 310; 30; 20 INJECTION, SOLUTION INTRAVENOUS at 12:30

## 2019-03-23 RX ADMIN — SODIUM CHLORIDE: 9 INJECTION, SOLUTION INTRAVENOUS at 14:19

## 2019-03-23 RX ADMIN — PENICILLIN G POTASSIUM 5 MILLION UNITS: 5000000 POWDER, FOR SOLUTION INTRAMUSCULAR; INTRAPLEURAL; INTRATHECAL; INTRAVENOUS at 08:17

## 2019-03-23 RX ADMIN — SODIUM CHLORIDE 250 ML: 9 INJECTION, SOLUTION INTRAVENOUS at 14:05

## 2019-03-23 RX ADMIN — Medication 25 MCG: at 00:31

## 2019-03-23 RX ADMIN — OXYTOCIN-SODIUM CHLORIDE 0.9% IV SOLN 30 UNIT/500ML 100 ML/HR: 30-0.9/5 SOLUTION at 15:57

## 2019-03-23 RX ADMIN — FENTANYL CITRATE 100 MCG: 50 INJECTION, SOLUTION INTRAMUSCULAR; INTRAVENOUS at 09:01

## 2019-03-23 RX ADMIN — SODIUM CHLORIDE, POTASSIUM CHLORIDE, SODIUM LACTATE AND CALCIUM CHLORIDE 125 ML/HR: 600; 310; 30; 20 INJECTION, SOLUTION INTRAVENOUS at 07:10

## 2019-03-23 RX ADMIN — LIDOCAINE HYDROCHLORIDE,EPINEPHRINE BITARTRATE 3 ML: 15; .005 INJECTION, SOLUTION EPIDURAL; INFILTRATION; INTRACAUDAL; PERINEURAL at 11:26

## 2019-03-23 RX ADMIN — OXYTOCIN-SODIUM CHLORIDE 0.9% IV SOLN 30 UNIT/500ML 2 MILLI-UNITS/MIN: 30-0.9/5 SOLUTION at 09:00

## 2019-03-23 RX ADMIN — Medication 12 ML/HR: at 11:32

## 2019-03-23 RX ADMIN — SODIUM CHLORIDE 2.5 MILLION UNITS: 9 INJECTION, SOLUTION INTRAVENOUS at 12:22

## 2019-03-23 NOTE — PLAN OF CARE
IUP 37+2  admitted to room 219 for cervical ripening due to poorly controlled A2GDM. Patient record reviewed. Vital signs per doc flowsheet. Fetal movement present.  Verbal consent for EFM, external fetal monitors applied. Admission assessment completed. Patient and support persons educated on labor process. Patient instructed to report change in fetal movement, contractions, vaginal leaking of fluid or bleeding, abdominal pain, or any concerns related to the pregnancy to her nurse/physician. Patient oriented to room, call light in reach. Dr. Campbell informed of patients arrival, orders put in. Patient verbalized understanding of education and verbalized agreement with plan.

## 2019-03-23 NOTE — ANESTHESIA PROCEDURE NOTES
Peripheral nerve/Neuraxial procedure note : epidural catheter  Pre-Procedure  Performed by Jackson Crespo MD  Location: OB      Pre-Anesthestic Checklist: patient identified, IV checked, risks and benefits discussed, informed consent, monitors and equipment checked, pre-op evaluation and at physician/surgeon's request    Timeout  Correct Patient: Yes   Correct Procedure: Yes   Correct Site: Yes   Correct Laterality: N/A   Correct Position: Yes   Site Marked: N/A   .   Procedure Documentation    .    Procedure:    Epidural catheter.  Insertion Site:L4-5  (midline approach) Injection technique: LORT saline   Local skin infiltrated with mL of 1% lidocaine.  SHEILA at 6 cm     Patient Prep;mask, sterile gloves, povidone-iodine 7.5% surgical scrub, patient draped.  .  Needle: Touhy needle Needle Gauge: 17.    Needle Length (Inches) 3.5  # of attempts: 1 and # of redirects:  .   . .  Catheter threaded easily  .  11 cm at skin.   .    Assessment/Narrative  Paresthesias: No.  .  .  Aspiration negative for heme or CSF  . Test dose of 3 mL lidocaine 1.5% w/ 1:200,000 epinephrine at. Test dose negative for signs of intravascular, subdural or intrathecal injection. Comments:  No complications.  Catheter secured with sterile dressing and tape.  Questions answered.

## 2019-03-23 NOTE — PLAN OF CARE
PT unable to move left leg well. Nuzhat Churchill used to assisted patient to BR. PT was able to void. Pericare done. Fresh ice pack and tucks provided. Pt transferred to room 414 via WC. PT stable at time of transfer. Report given to PAM Morocho. Band check done.

## 2019-03-23 NOTE — ANESTHESIA PREPROCEDURE EVALUATION
"Anesthesia Pre-Procedure Evaluation    Patient: Stacey BARAKAT Ipekci   MRN: [id number] : 1990          Preoperative Diagnosis: * No surgery found *        Past Medical History:   Diagnosis Date     Gestational diabetes mellitus (GDM) in childbirth, insulin controlled 3/22/2019     Past Surgical History:   Procedure Laterality Date     THYROIDECTOMY N/A 2016    Procedure: THYROIDECTOMY;  Surgeon: Sanjuana Vivas MD;  Location: [facility] OR     TONSILLECTOMY                          Lab Results   Component Value Date    HCG Negative 2016       Preop Vitals  BP Readings from Last 3 Encounters:   19 143/78   16 138/88   16 122/60    Pulse Readings from Last 3 Encounters:   16 76      Resp Readings from Last 3 Encounters:   19 18   16 16   10/06/15 16    SpO2 Readings from Last 3 Encounters:   16 96%   16 97%   10/06/15 99%      Temp Readings from Last 1 Encounters:   19 36  C (96.8  F) (Temporal)    Ht Readings from Last 1 Encounters:   16 1.651 m (5' 5\")      Wt Readings from Last 1 Encounters:   16 85 kg (187 lb 4.8 oz)    Estimated body mass index is 31.17 kg/m  as calculated from the following:    Height as of 16: 1.651 m (5' 5\").    Weight as of 16: 85 kg (187 lb 4.8 oz).       Anesthesia Plan      History & Physical Review      ASA Status:  2 .        Plan for Epidural          Postoperative Care      Consents                 CHRISTOS CLARK MD  "

## 2019-03-23 NOTE — H&P
[facility]   LABOR ADMIT    Stacey BARAKAT Ipekci MRN# [id number]  Age: 28 year old YOB: 1990    Date of Admission: 3/22/2019    Primary care provider: Mariana Reynolds     HPI: This is a 28 year old  at 37w3d presenting for IOL due to poorly controlled A2GDM. She conceived while taking OCPs and discontinued once she discovered she was pregnant. She declined genetic screening. On her 20 week ultrasound, there was possibility of b/l UTD and small VSD - a repeat 24 week scan did not show these abnormalities. She was diagnosed with A2GDM and has required increasing dosing of insulin at bedtime with initial control of fasting blood sugar with new doses but then quickly reverting back to fasting levels >100. On 3/20/2019, ultrasound with EFW 7#3 oz with BPP 8/8. She is GBS positive with clindamycin resistance.     Past Medical History:  This patient has no significant past medical history     Past Surgical History:  This patient has no significant past surgical history     Social History:  This patient has no significant social history     Family History:  This patient has no significant family history     Allergies:  NKDA    Physical Exam:  /75   Temp 98.2  F (36.8  C) (Temporal)   Resp 18   Gen: NAD  Abd: soft, NT, ND, gravid  Ext: NT, nonedematous  SVE: 3/80/-2 AROM for scant clear fluid    FHT: 140s/mod/+accels/no decels  [place]: ctx q 1-3 mins    Prenatal Labs:   Lab Results   Component Value Date    ABO B 2019    [facility] Pos 2019    HEPBANG negative 2018    RUBELLAABIGG immune 2018       GBS Status:   Lab Results   Component Value Date    GBS positive 2019       Assessment:  This is a 28 year old  at 37w3d admitted to L&D for IOL due to poorly uncontrolled A2GDM on insulin     Plan:  Admitted to L&D.  Received cytotec overnight for cervical ripening  Amniotomy performed now  To start pitocin per protocol  EFM appropriate and reactive  GBS pos  - for abx ppx  Monitor accuchecks, start insulin drip if indicated based on persistently elevated levels  Advised okay for pain control measures PRN - discussed realistic expectations for length of induction which may play in to timing of pain control decisions  Anticipate HERNAN Campbell MD

## 2019-03-23 NOTE — PLAN OF CARE
1332:  Dr Campbell in room and assessed FHTs and reviewed strip.  Pitocin still off at this time, continuing to try different positions, O2 on.  Orders received to start amnioinfusion.  Insulin infsuion also initiated.  Dr Campbell will stay in house until delivery.  Category 2 tracing, continuously monitored, see flowsheets for interventions.  1502:  Patient complete and pushing started, O2 continued, amnioinfusion discontinued.  Dr Campbell present during pushing.  1518:  Live female born via vaginal delivery, 30 second shoulder dystocia, Apgars 7 and 8, NNP came to assess baby.  Report given to Myra BROOKS RN taking over care.

## 2019-03-23 NOTE — PROVIDER NOTIFICATION
03/23/19 1253   Provider Notification   Provider Name/Title Dr Campbell   Method of Notification Phone   Request Evaluate in Person   Patient having persistent lates after epidural.  Fluid bolus, O2, pitocin off, FSE and IUPC placed, repositioned to both sides.  Dr Campbell updated and on her way.

## 2019-03-23 NOTE — PROGRESS NOTES
[facility]  Obstetrics    Stacey BARAKAT Ipekci  1990  [id number]    S: Feeling comfortable with epidural. Denies HA, vision changes, CP, SOB, RUQ/epigastric pain.    O: /63   Temp 96.7  F (35.9  C) (Temporal)   Resp 16   SpO2 100%   Several severe range BPs noted previous - cuff changed and now normotensive  Gen: NAD  Abd: soft, NT, ND, gravid  SVE: cervix very soft and stretchy, difficult to appropriately assess dilation - can stretch to 7 cm/90  Fetus felt somewhat ballotable, and the fetal head was manually grasped and with slight manipulation a large gush of amniotic fluid was produced    FHT: 130s/mod/no accels/late decels, responds to scalp stimulation  [place]: ctx q3-5 mins    Most recent accucheck 133    A/P:  27 yo  @ 37+3 admitted for IOL due to poorly controlled A2GDM on insulin    A2GDM  - persistently elevated blood sugars - to start insulin drip    Elevated BPs  - multiple severe range BPs noted with max 180s/80s - BP cuff changed and now 117/63  - continue to monitor BPs closely as unsure if simply attributed to improper cuff  - asx for PEC sx  - PEC labs sent  - discussed if any more elevated BPs are noted I would recommend caution and starting magnesium for seizure ppx    IOL  - pitocin has been discontinued in setting of Category II tracing with late decelerations  - cervix continues to progress in dilation  - discussed potential indications for  section  - GBS pos - PCN ppx adequate    Cat II tracing  - maternal oxygen supplementation is being administered, pt has been repositioned multiple times, pitocin has been discontinued  - reassuringly, variability is moderate and the fetus responds to scalp stimulation  - will attempt amnioinfusion  - pt aware  section will be indicated if tracing does not improve    I will remain in house until delivery.     Therese Campbell MD  3/23/2019 6007    Update:   Amnioinfusion initiated and pt repositioned to back with  slight leftward tilt. Improvement in FHT to 130s/mod/intermittent brief variable decels. Discussed that this is encouraging. Will hope to achieve .   Insulin drip successfully started.    Therese Campbell MD  3/23/2019 1425    Update:  Creatinine 0.57, AST 18, ALT 22, plts 214, Hgb 12.3  No lab abnormalities concerning for preeclampsia.     Therese Campbell MD   3/23/2019 6815

## 2019-03-23 NOTE — PLAN OF CARE
Vital signs stable, SVE 2-3 cms/70%/-2/intact. Third dose of cytotec not given. FHT Cat I, Contractions every 2-5 mins, mild to moderate by palpation. Will continue to monitor patient

## 2019-03-23 NOTE — L&D DELIVERY NOTE
OB Vaginal Delivery Summary    Stacey BARAKAT Ipekci   Admission date: 3/22/2019  Delivery date:  19  Place of delivery: [facility]    The patient is a 28 year old  at 37w3d  wks gestation admitted to labor and delivery for IOL due to poorly controlled A2GDM on insulin. The estimate fetal weight is 7.5#. GBS was positive.    Cervical ripening was achieved with two doses of vaginal cytotec. Membranes artificially ruptured and the fluid was clear. Pitocin was administered per protocol. For pain management she had epidural with good relief. During labor the fetal heart tones were initially Category I but became Category II after epidural administration. Maternal oxygen supplementation, pitocin discontinuation, maternal repositioning, and administration of amnioinfusion improved FHT. Internal monitors were applied.    She progressed to complete dilation at 1500 and began pushing. She had excellent maternal expulsive efforts. Throughout the second stage, fetal heart tones were Category II due to variable decelerations.     At 1518 the fetal head delivered in GWEN presentation and then resistance was met. A thorough evaluation revealed no nuchal cord. Approximately 30 second shoulder dystocia encountered. Additional traction initially applied, then the pt was placed in [place] and suprapubic pressure was applied to maternal right. Ferrera screw maneuver initially attempted to turn right/anterior shoulder to maternal right but no progress made, right/anterior shoulder then rotated to left and it was able to be freed. The infant delivered and was placed on the maternal abdomen. The infant was initially stunned and was vigorously stimulated. The cord was clamped and cut after about 10 seconds, and the infant began crying thereafter. A segment was obtained for cord gases - no arterial sample able to be obtained, only venous sample sent - pH 7.37 with base deficit 1.1. The infant was brought to the  warmer for more thorough evaluation. Infant weight 7#6.9 oz.     The placenta delivered spontaneously and intact.  The patient received pitocin upon placental delivery.  There was 500 mL blood loss within the under buttocks drape.    The cervix and vagina were inspected.  There was a small second degree perineal laceration which was repaired with 3-0 vicryl assuring hemostasis and close approximation. A left periurethral laceration was additionally noted and was consistently bleeding. A red rubber catheter was placed to ensure urethral patency was retained. A figure of 8 of 0-Vicryl was placed and hemostasis was achieved.     Mother and baby did well and went to normal postpartum and  nursery.      Therese Campbell MD

## 2019-03-24 LAB
GLUCOSE BLDC GLUCOMTR-MCNC: 104 MG/DL (ref 70–99)
GLUCOSE BLDC GLUCOMTR-MCNC: 136 MG/DL (ref 70–99)
GLUCOSE BLDC GLUCOMTR-MCNC: 136 MG/DL (ref 70–99)

## 2019-03-24 PROCEDURE — 25000132 ZZH RX MED GY IP 250 OP 250 PS 637: Performed by: OBSTETRICS & GYNECOLOGY

## 2019-03-24 PROCEDURE — 12000035 ZZH R&B POSTPARTUM

## 2019-03-24 PROCEDURE — 00000146 ZZHCL STATISTIC GLUCOSE BY METER IP

## 2019-03-24 RX ADMIN — ACETAMINOPHEN 650 MG: 325 TABLET, FILM COATED ORAL at 14:04

## 2019-03-24 RX ADMIN — SENNOSIDES AND DOCUSATE SODIUM 1 TABLET: 8.6; 5 TABLET ORAL at 09:44

## 2019-03-24 RX ADMIN — ACETAMINOPHEN 650 MG: 325 TABLET, FILM COATED ORAL at 09:44

## 2019-03-24 RX ADMIN — ACETAMINOPHEN 650 MG: 325 TABLET, FILM COATED ORAL at 18:15

## 2019-03-24 RX ADMIN — SENNOSIDES AND DOCUSATE SODIUM 1 TABLET: 8.6; 5 TABLET ORAL at 22:24

## 2019-03-24 RX ADMIN — HYDROCORTISONE: 25 CREAM TOPICAL at 10:07

## 2019-03-24 RX ADMIN — ACETAMINOPHEN 650 MG: 325 TABLET, FILM COATED ORAL at 05:02

## 2019-03-24 RX ADMIN — ACETAMINOPHEN 650 MG: 325 TABLET, FILM COATED ORAL at 22:24

## 2019-03-24 RX ADMIN — ACETAMINOPHEN 650 MG: 325 TABLET, FILM COATED ORAL at 01:02

## 2019-03-24 NOTE — PROVIDER NOTIFICATION
Paged on call OB for . Waiting for call back.     Dr. Barrera called back. Check fast blood glucose in am. 1 hour post prandial. Notify provider in blood glucose is 130 or greater. TORB.

## 2019-03-24 NOTE — PLAN OF CARE
Vital signs stable, afebrile, voiding well, SL's DC'd, ice and tucks to perineum prn, FFU/1-2 scant rubra lochia, able to rest, pain well controlled with medications, rates her pain 3/10, breast feeding  skin-to-skin on demand. Plan is to check fasting BGM at 0600.  is here and is supportive.

## 2019-03-24 NOTE — LACTATION NOTE
Routine visit with Stacey, FOB and baby .   Breastfeeding general information reviewed.   Advised to breastfeed exclusively, on demand, avoid pacifiers, bottles and formula unless medically indicated.  Encouraged rooming in, skin to skin, feeding on demand 8-12x/day or sooner if baby cues.  Explained benefits of holding and skin to skin.  Encouraged lots of skin to skin. Instructed on hand expression.     Baby has been sucking on tongue and mother reports she is sore.  Using lanolin. Sore nipple shells given.  Stacey has a pump.  Outpatient resource phone numbers given. Continues to nurse well when awake and after establishing suckling pattern on finger and switching to the breas per mom.  Discussed nipple shield if needed.   No further questions at this time.   Will follow as needed.   Minerva MALDONADON, RN, PHN, RNC-MNN, IBCLC

## 2019-03-24 NOTE — PROGRESS NOTES
Stacey BARAKAT Ipekci  2019    S: pt is overall doing well.  Reports her bottom is sore but overall pain well controlled with ibuprofen and tylenol.  Ambulating without difficulty.  Tolerating po intake.  Decreasing lochia.  Breast feeding.    O: /73   Temp 98.8  F (37.1  C) (Oral)   Resp 18   SpO2 97%   Breastfeeding? Unknown   Recent Labs   Lab 19  1400   HGB 12.3     Glucose 104 Abnormally high   H  70 - 99 mg/dL Final 2019  6:37 AM       Abdomen: soft, non tender, fundus firm below the umbilicus  Ext: non tender, no edema or erythema    A/P: s/p  PPD #1  GDM A2- fasting blood glucose slightly elevated this morning.  Plan for one hour postprandial glucose checks and fasting again tomorrow.  If significantly elevated, plan to add pre prandial and possible hospitalist consult.  Pt is aware that she will need to report to clinic for a 2 hour test between 6-12 weeks postpartum.   Elevated blood pressures in labor- mild blood pressures pp with most recent blood pressures normotensive. Continue to monitor.  Continue routine post partum care  Discharge planning for tomorrow    Greg Barrera

## 2019-03-24 NOTE — PLAN OF CARE
Resumed care of patient at 1900. Per flow sheet VSS, fundus firm and midline, and scant flow. Denies pain, tylenol and ibuprofen available. Safety and security reviewed with family. Ambulating free of dizziness or lightheaded. Able to void x1 this shift. Blood glucose 112, provider notified see note. Encouraged to call with needs, questions, or concerns. Will continue to monitor.

## 2019-03-24 NOTE — PLAN OF CARE
Patient arrived to floor at 1815 escorted by Myra MICHAELS RN. Nuzhat Tigre used to transfer patient into the bed due to numb leg. Call light with in reach. Patient and family oriented to room and floor. Infant safety discussed. Use of bulb suction demonstrated.

## 2019-03-24 NOTE — PLAN OF CARE
Vital signs stable. Patient getting post prandial blood sugars. She did not eat today until 1pm, and post meal blood sugar was 138. Patient encouraged to eat at more normal intervals to prevent the spike in her sugars. Using tylenol to manage pain. Hydrocortisone given this morning for hemorrhoids. Working on breastfeeding every 2-3 hours. Questions/concerns addressed. Will continue to monitor.

## 2019-03-24 NOTE — ANESTHESIA POSTPROCEDURE EVALUATION
Patient: Stacey BARAKAT Ipekci    * No procedures listed *    Diagnosis:* No pre-op diagnosis entered *  Diagnosis Additional Information: No value filed.    Anesthesia Type:  No value filed.    Note:  Anesthesia Post Evaluation    Patient location during evaluation: bedside  Patient participation: Able to fully participate in evaluation  Level of consciousness: awake  Pain management: adequate  Airway patency: patent  Cardiovascular status: acceptable  Respiratory status: acceptable  Hydration status: acceptable  PONV: none     Anesthetic complications: None    Comments: Patient denies any residual numbness or weakness in bilateral lower extremities, severe back pain, or positional headache. Patient was instructed to inform nurse of any of these symptoms and to contact anesthesia to reevaluate.        Last vitals:  Vitals:    03/24/19 0944 03/24/19 1030 03/24/19 1500   BP: 128/89  128/80   Resp: 16 16 16   Temp: 36.7  C (98  F)  36.7  C (98  F)   SpO2:            Electronically Signed By: Hemanth Vincent MD  March 24, 2019  5:23 PM

## 2019-03-24 NOTE — PLAN OF CARE
Vital signs stable. Denies need for pain medications at this time, but is aware that tylenol/ibuprofen is available if needed. Working on breastfeeding infant every 2-3 hours. Questions/concerns addressed.

## 2019-03-25 VITALS
OXYGEN SATURATION: 97 % | RESPIRATION RATE: 18 BRPM | DIASTOLIC BLOOD PRESSURE: 85 MMHG | SYSTOLIC BLOOD PRESSURE: 132 MMHG | TEMPERATURE: 98.1 F

## 2019-03-25 LAB — GLUCOSE BLDC GLUCOMTR-MCNC: 98 MG/DL (ref 70–99)

## 2019-03-25 PROCEDURE — 00000146 ZZHCL STATISTIC GLUCOSE BY METER IP

## 2019-03-25 PROCEDURE — 25000132 ZZH RX MED GY IP 250 OP 250 PS 637: Performed by: OBSTETRICS & GYNECOLOGY

## 2019-03-25 RX ORDER — ACETAMINOPHEN 325 MG/1
650 TABLET ORAL EVERY 4 HOURS PRN
Qty: 100 TABLET | Refills: 0 | Status: SHIPPED | OUTPATIENT
Start: 2019-03-25 | End: 2019-05-06

## 2019-03-25 RX ORDER — IBUPROFEN 800 MG/1
800 TABLET, FILM COATED ORAL EVERY 6 HOURS PRN
Qty: 60 TABLET | Refills: 0 | Status: SHIPPED | OUTPATIENT
Start: 2019-03-25 | End: 2019-05-06

## 2019-03-25 RX ADMIN — ACETAMINOPHEN 650 MG: 325 TABLET, FILM COATED ORAL at 10:33

## 2019-03-25 RX ADMIN — ACETAMINOPHEN 650 MG: 325 TABLET, FILM COATED ORAL at 06:22

## 2019-03-25 RX ADMIN — ACETAMINOPHEN 650 MG: 325 TABLET, FILM COATED ORAL at 02:31

## 2019-03-25 NOTE — LACTATION NOTE
Visited with family for routine lactation visit. Stacey reports infant is breastfeeding well, but has questions about positioning. Upon assessment, infant latched fairly well, but much of areola visible outside of mouth. Infant removed from breast to relatch. Infant got very fussy once removed. Mother able to comfort infant. Mother has questions about diet/nutrion while breastfeeding as infant was gassy last evening. Reviewed importance of balanced diet and additional caloric needs for lactating mom. Discussed normal infant awake/sleep cycles and that infants are frequently more alert/fussy in evenings. Discussed comfort techniques. Parents receptive to education. Encouraged to call for questions or concerns.

## 2019-03-25 NOTE — PLAN OF CARE
Assumed care at 1900. Pain controlled with tylenol. Voiding adequate amounts without difficulty. Encouraged to call with questions and/or concerns.

## 2019-03-25 NOTE — DISCHARGE INSTRUCTIONS
Postpartum Vaginal Delivery Instructions    Activity       Ask family and friends for help when you need it.    Do not place anything in your vagina for 6 weeks.    You are not restricted on other activities, but take it easy for a few weeks to allow your body to recover from delivery.  You are able to do any activities you feel up to that point.    No driving until you have stopped taking your pain medications (usually two weeks after delivery).     Call your health care provider if you have any of these symptoms:       Increased pain, swelling, redness, or fluid around your stiches from an episiotomy or perineal tear.    A fever above 100.4 F (38 C) with or without chills when placing a thermometer under your tongue.    You soak a sanitary pad with blood within 1 hour, or you see blood clots larger than a golf ball.    Bleeding that lasts more than 6 weeks.    Vaginal discharge that smells bad.    Severe pain, cramping or tenderness in your lower belly area.    A need to urinate more frequently (use the toilet more often), more urgently (use the toilet very quickly), or it burns when you urinate.    Nausea and vomiting.    Redness, swelling or pain around a vein in your leg.    Problems breastfeeding or a red or painful area on your breast.    Chest pain and cough or are gasping for air.    Problems coping with sadness, anxiety, or depression.  If you have any concerns about hurting yourself or the baby, call your provider immediately.     You have questions or concerns after you return home.     Keep your hands clean:  Always wash your hands before touching your perineal area and stitches.  This helps reduce your risk of infection.  If your hands aren't dirty, you may use an alcohol hand-rub to clean your hands. Keep your nails clean and short.    Please call the office if you experience  - bleeding through more than a pad per hour persistently  - passage of blood clots greater than the size of yonis  - abnormal  vaginal discharge  - temperature greater than 100.4  - inability to tolerate food or fluid  - pain not controlled with oral medications  - persistent headache, vision changes, chest pain, shortness of breath, pain in the upper belly  - significant breast pain  - mood changes that affect your quality of life    Please make an appointment at [facility] in 6 weeks for a postpartum visit.     You will need to do diabetes testing in 6-12 weeks.

## 2019-03-25 NOTE — PLAN OF CARE
Taking Tylenol for pain. Using IP and Tucks. Breastfeeding her baby girl ind. Using lanolin and sore nipples shells for tender nipples. Ready to go home. Discharge instructions reviewed with pt. and she verbalized understanding. Discharge to home with baby.

## 2019-03-25 NOTE — PROGRESS NOTES
Stacey YUVAL Ipekci  2019   PPD2 s/p  after IOL for poorly controlled A2GDM    S: 28 year old  delivered at 37w3d   Doing well without complaints.  Sore but medication helping.   Lochia moderate.   Ambulating.  Urinating without issues.  Breastfeeding. Struggling a bit, unsure if baby is latching appropriately or getting anything.     O: /75   Temp 97.9  F (36.6  C) (Oral)   Resp 16   SpO2 97%   Breastfeeding? Unknown   Gen: NAD  Abd deferred given no complaints and working on breastfeeding currently  Ext: NT, nonedematous    A/P:  28 year old  PPD2 s/p  after IOL for poorly controlled A2GDM on insulin, delivery c/b SD  - ibuprofen and acetaminophen PRN pain  - support breastfeeding  - monitor lochia  - encourage ambulation  - regular diet  - routine PP care  - stable for discharge to home today  - needs 2 hr GCT in the office in 6-12 weeks    Therese Campbell MD  Text Page (8am - 5pm)

## 2019-03-25 NOTE — PLAN OF CARE
VSS.  Pain well controlled with PRN tylenol. Up independently in room.  Working on breastfeeding  and  cares. Fasting blood sugar to be checked this AM. Continue to monitor and notify MD as needed.

## 2019-03-25 NOTE — DISCHARGE SUMMARY
[facility]    Discharge Summary  Obstetrics    Date of Admission:  3/22/2019  Date of Discharge:  3/25/2019  Discharging Provider: Therese Campbell    Discharge Diagnoses   A2GDM on insulin - poorly controlled  S/p  c/b SD    History of Present Illness   Stacey Bhandari is a 28 year old  who presented @ 37+2 for IOL due to poorly controlled A2GDM on insulin. She conceived on OCPs and discontinued once she discovered she was pregnant. She declined genetic testing. She had a normal 20 week ultrasound. After dx of GDM, she required increasing doses of at bedtime insulin with fasting blood sugars still primarily 100s. IOL was recommended. Cytotec was administered for cervical ripening. Amniotomy and pitocin were used for induction. She went on to deliver a viable female infant weighing 7#7oz with Apgars of 7/8. Delivery complicated by 30 second SD requiring Tino, suprapubic pressure, Ferrera corkscrew maneuver.     Hospital Course   The patient's hospital course was unremarkable.  She recovered as anticipated and experienced no post-delivery complications. On discharge, her pain was well controlled. Vaginal bleeding appropriate.  Voiding without difficulty.  Ambulating well and tolerating a normal diet.  No fevers.  Breastfeeding well.  Infant stable.  She was discharged on post-partum day 2. She will require 2 hr GCT testing in 6-12 weeks.     Post-partum hemoglobin:   Hemoglobin   Date Value Ref Range Status   2019 12.3 11.7 - 15.7 g/dL Final       Therese Campbell MD       Discharge Disposition   Discharged to home   Condition at discharge: Stable    Primary Care Physician   Mariana Reynolds    Consultations This Hospital Stay   ANESTHESIOLOGY IP CONSULT  HOME CARE POST PARTUM/ IP CONSULT  LACTATION IP CONSULT    Discharge Orders      Activity    Review discharge instructions     Reason for your hospital stay    Maternity care     Discharge Instructions - Postpartum visit     Schedule postpartum visit at [facility] in 6 weeks     Discharge Instructions - Gestational diabetic patients    Gestational diabetic patients to follow up for fasting blood sugar and 2 hour 75gm glucose load at 6 weeks postpartum.     Diet    Resume previous diet     Discharge Medications   Current Discharge Medication List      START taking these medications    Details   acetaminophen (TYLENOL) 325 MG tablet Take 2 tablets (650 mg) by mouth every 4 hours as needed for mild pain  Qty: 100 tablet, Refills: 0    Associated Diagnoses: Normal spontaneous vaginal delivery      ibuprofen (ADVIL/MOTRIN) 800 MG tablet Take 1 tablet (800 mg) by mouth every 6 hours as needed for other (cramping)  Qty: 60 tablet, Refills: 0    Associated Diagnoses: Normal spontaneous vaginal delivery         CONTINUE these medications which have NOT CHANGED    Details   acetone urine (KETOSTIX) test strip Test daily, when negative for a week then reduce testing to once weekly.  Qty: 25 each, Refills: 1    Associated Diagnoses: Gestational diabetes      blood glucose (ONETOUCH VERIO IQ) test strip Use to test blood sugar 4 times daily or as directed.  Ok to substitute alternative if insurance prefers.  Qty: 150 each, Refills: 1    Associated Diagnoses: Gestational diabetes      blood glucose monitoring (ONE TOUCH DELICA) lancets Use to test blood sugars 4 times daily or as directed.  Qty: 100 each, Refills: 1    Associated Diagnoses: Gestational diabetes      insulin pen needle (BD SAAD U/F) 32G X 4 MM miscellaneous Use 2 daily as directed.  Qty: 100 each, Refills: 4    Comments: Dose increase  Associated Diagnoses: Gestational diabetes         STOP taking these medications       Blood Glucose Monitoring Suppl (ONETOUCH VERIO FLEX SYSTEM) w/Device KIT Comments:   Reason for Stopping:         HYDROcodone-acetaminophen (NORCO) 5-325 MG per tablet Comments:   Reason for Stopping:         insulin isophane human (HUMULIN N KWIKPEN) 100 UNIT/ML  injection Comments:   Reason for Stopping:         insulin lispro (HUMALOG PEN) 100 UNIT/ML pen Comments:   Reason for Stopping:         norgestimate-ethinyl estradiol (ORTHO-CYCLEN, SPRINTEC) 0.25-35 MG-MCG per tablet Comments:   Reason for Stopping:             Allergies   No Known Allergies    Therese Campbell MD

## 2019-03-26 LAB — COPATH REPORT: NORMAL

## 2019-04-02 ENCOUNTER — HOSPITAL ENCOUNTER (OUTPATIENT)
Facility: CLINIC | Age: 29
End: 2019-04-02
Admitting: OBSTETRICS & GYNECOLOGY
Payer: COMMERCIAL

## 2019-04-30 ENCOUNTER — TRANSFERRED RECORDS (OUTPATIENT)
Dept: HEALTH INFORMATION MANAGEMENT | Facility: CLINIC | Age: 29
End: 2019-04-30

## 2019-04-30 LAB
ALT SERPL-CCNC: 31 U/L (ref 14–63)
AST SERPL-CCNC: 32 U/L (ref 15–37)
CREAT SERPL-MCNC: 0.71 MG/DL (ref 0.51–0.95)
GFR SERPL CREATININE-BSD FRML MDRD: >60 ML/MIN/1.73M2 (ref 60–150)
GLUCOSE SERPL-MCNC: 135 MG/DL (ref 74–100)
POTASSIUM SERPL-SCNC: 4.1 MMOL/L (ref 3.5–5.1)

## 2019-05-06 ENCOUNTER — TELEPHONE (OUTPATIENT)
Dept: SURGERY | Facility: CLINIC | Age: 29
End: 2019-05-06

## 2019-05-06 ENCOUNTER — OFFICE VISIT (OUTPATIENT)
Dept: SURGERY | Facility: CLINIC | Age: 29
End: 2019-05-06
Payer: COMMERCIAL

## 2019-05-06 VITALS
HEIGHT: 65 IN | SYSTOLIC BLOOD PRESSURE: 130 MMHG | WEIGHT: 228 LBS | BODY MASS INDEX: 37.99 KG/M2 | DIASTOLIC BLOOD PRESSURE: 80 MMHG | HEART RATE: 95 BPM

## 2019-05-06 DIAGNOSIS — K80.20 CALCULUS OF GALLBLADDER WITHOUT CHOLECYSTITIS WITHOUT OBSTRUCTION: Primary | ICD-10-CM

## 2019-05-06 PROCEDURE — 99244 OFF/OP CNSLTJ NEW/EST MOD 40: CPT | Performed by: SURGERY

## 2019-05-06 ASSESSMENT — MIFFLIN-ST. JEOR: SCORE: 1760.08

## 2019-05-06 NOTE — TELEPHONE ENCOUNTER
Type of surgery: Lap jen  Location of surgery: [facility]  Date and time of surgery: 5/7/19 at 7:30am  Surgeon: Dr. Kannan Cuevas  Pre-Op Appt Date: Patient to schedule  Post-Op Appt Date: Patient to schedule   Packet sent out: Yes  Pre-cert/Authorization completed:  Not Applicable  Date: 5/6/19

## 2019-05-07 ENCOUNTER — ANESTHESIA EVENT (OUTPATIENT)
Dept: SURGERY | Facility: CLINIC | Age: 29
End: 2019-05-07
Payer: COMMERCIAL

## 2019-05-07 ENCOUNTER — HOSPITAL ENCOUNTER (OUTPATIENT)
Facility: CLINIC | Age: 29
Discharge: HOME | End: 2019-05-07
Attending: SURGERY | Admitting: SURGERY
Payer: COMMERCIAL

## 2019-05-07 ENCOUNTER — APPOINTMENT (OUTPATIENT)
Dept: SURGERY | Facility: PHYSICIAN GROUP | Age: 29
End: 2019-05-07
Payer: COMMERCIAL

## 2019-05-07 ENCOUNTER — ANESTHESIA (OUTPATIENT)
Dept: SURGERY | Facility: CLINIC | Age: 29
End: 2019-05-07
Payer: COMMERCIAL

## 2019-05-07 VITALS
WEIGHT: 227.9 LBS | TEMPERATURE: 97.5 F | HEIGHT: 65 IN | BODY MASS INDEX: 37.97 KG/M2 | DIASTOLIC BLOOD PRESSURE: 95 MMHG | HEART RATE: 75 BPM | SYSTOLIC BLOOD PRESSURE: 143 MMHG | RESPIRATION RATE: 16 BRPM | OXYGEN SATURATION: 97 %

## 2019-05-07 DIAGNOSIS — G89.18 POST-OP PAIN: Primary | ICD-10-CM

## 2019-05-07 LAB — HCG UR QL: NEGATIVE

## 2019-05-07 PROCEDURE — 47562 LAPAROSCOPIC CHOLECYSTECTOMY: CPT | Performed by: SURGERY

## 2019-05-07 PROCEDURE — 27210794 ZZH OR GENERAL SUPPLY STERILE: Performed by: SURGERY

## 2019-05-07 PROCEDURE — 25000128 H RX IP 250 OP 636: Performed by: SURGERY

## 2019-05-07 PROCEDURE — 37000008 ZZH ANESTHESIA TECHNICAL FEE, 1ST 30 MIN: Performed by: SURGERY

## 2019-05-07 PROCEDURE — 81025 URINE PREGNANCY TEST: CPT | Performed by: ANESTHESIOLOGY

## 2019-05-07 PROCEDURE — 25000132 ZZH RX MED GY IP 250 OP 250 PS 637: Performed by: PHYSICIAN ASSISTANT

## 2019-05-07 PROCEDURE — 25800030 ZZH RX IP 258 OP 636: Performed by: NURSE ANESTHETIST, CERTIFIED REGISTERED

## 2019-05-07 PROCEDURE — 37000009 ZZH ANESTHESIA TECHNICAL FEE, EACH ADDTL 15 MIN: Performed by: SURGERY

## 2019-05-07 PROCEDURE — 88304 TISSUE EXAM BY PATHOLOGIST: CPT | Performed by: SURGERY

## 2019-05-07 PROCEDURE — 36000058 ZZH SURGERY LEVEL 3 EA 15 ADDTL MIN: Performed by: SURGERY

## 2019-05-07 PROCEDURE — 40000170 ZZH STATISTIC PRE-PROCEDURE ASSESSMENT II: Performed by: SURGERY

## 2019-05-07 PROCEDURE — 71000027 ZZH RECOVERY PHASE 2 EACH 15 MINS: Performed by: SURGERY

## 2019-05-07 PROCEDURE — 25000125 ZZHC RX 250: Performed by: SURGERY

## 2019-05-07 PROCEDURE — 25000566 ZZH SEVOFLURANE, EA 15 MIN: Performed by: SURGERY

## 2019-05-07 PROCEDURE — 71000012 ZZH RECOVERY PHASE 1 LEVEL 1 FIRST HR: Performed by: SURGERY

## 2019-05-07 PROCEDURE — 25000128 H RX IP 250 OP 636: Performed by: NURSE ANESTHETIST, CERTIFIED REGISTERED

## 2019-05-07 PROCEDURE — 71000013 ZZH RECOVERY PHASE 1 LEVEL 1 EA ADDTL HR: Performed by: SURGERY

## 2019-05-07 PROCEDURE — 25000128 H RX IP 250 OP 636: Performed by: ANESTHESIOLOGY

## 2019-05-07 PROCEDURE — 36000056 ZZH SURGERY LEVEL 3 1ST 30 MIN: Performed by: SURGERY

## 2019-05-07 PROCEDURE — 25000125 ZZHC RX 250: Performed by: NURSE ANESTHETIST, CERTIFIED REGISTERED

## 2019-05-07 PROCEDURE — 47562 LAPAROSCOPIC CHOLECYSTECTOMY: CPT | Mod: AS | Performed by: PHYSICIAN ASSISTANT

## 2019-05-07 RX ORDER — FENTANYL CITRATE 50 UG/ML
INJECTION, SOLUTION INTRAMUSCULAR; INTRAVENOUS PRN
Status: DISCONTINUED | OUTPATIENT
Start: 2019-05-07 | End: 2019-05-07

## 2019-05-07 RX ORDER — FENTANYL CITRATE 50 UG/ML
25-50 INJECTION, SOLUTION INTRAMUSCULAR; INTRAVENOUS
Status: DISCONTINUED | OUTPATIENT
Start: 2019-05-07 | End: 2019-05-07 | Stop reason: HOSPADM

## 2019-05-07 RX ORDER — LIDOCAINE HYDROCHLORIDE 10 MG/ML
INJECTION, SOLUTION EPIDURAL; INFILTRATION; INTRACAUDAL; PERINEURAL
Status: DISCONTINUED
Start: 2019-05-07 | End: 2019-05-07 | Stop reason: HOSPADM

## 2019-05-07 RX ORDER — PROPOFOL 10 MG/ML
INJECTION, EMULSION INTRAVENOUS PRN
Status: DISCONTINUED | OUTPATIENT
Start: 2019-05-07 | End: 2019-05-07

## 2019-05-07 RX ORDER — NALOXONE HYDROCHLORIDE 0.4 MG/ML
.1-.4 INJECTION, SOLUTION INTRAMUSCULAR; INTRAVENOUS; SUBCUTANEOUS
Status: DISCONTINUED | OUTPATIENT
Start: 2019-05-07 | End: 2019-05-07 | Stop reason: HOSPADM

## 2019-05-07 RX ORDER — ONDANSETRON 4 MG/1
4 TABLET, ORALLY DISINTEGRATING ORAL EVERY 30 MIN PRN
Status: DISCONTINUED | OUTPATIENT
Start: 2019-05-07 | End: 2019-05-07 | Stop reason: HOSPADM

## 2019-05-07 RX ORDER — KETOROLAC TROMETHAMINE 30 MG/ML
INJECTION, SOLUTION INTRAMUSCULAR; INTRAVENOUS PRN
Status: DISCONTINUED | OUTPATIENT
Start: 2019-05-07 | End: 2019-05-07

## 2019-05-07 RX ORDER — MEPERIDINE HYDROCHLORIDE 25 MG/ML
12.5 INJECTION INTRAMUSCULAR; INTRAVENOUS; SUBCUTANEOUS
Status: DISCONTINUED | OUTPATIENT
Start: 2019-05-07 | End: 2019-05-07 | Stop reason: HOSPADM

## 2019-05-07 RX ORDER — ONDANSETRON 2 MG/ML
4 INJECTION INTRAMUSCULAR; INTRAVENOUS EVERY 30 MIN PRN
Status: DISCONTINUED | OUTPATIENT
Start: 2019-05-07 | End: 2019-05-07 | Stop reason: HOSPADM

## 2019-05-07 RX ORDER — ONDANSETRON 2 MG/ML
INJECTION INTRAMUSCULAR; INTRAVENOUS PRN
Status: DISCONTINUED | OUTPATIENT
Start: 2019-05-07 | End: 2019-05-07

## 2019-05-07 RX ORDER — PROPOFOL 10 MG/ML
INJECTION, EMULSION INTRAVENOUS CONTINUOUS PRN
Status: DISCONTINUED | OUTPATIENT
Start: 2019-05-07 | End: 2019-05-07

## 2019-05-07 RX ORDER — TRAMADOL HYDROCHLORIDE 50 MG/1
50 TABLET ORAL EVERY 6 HOURS PRN
Qty: 18 TABLET | Refills: 0 | Status: ON HOLD | OUTPATIENT
Start: 2019-05-07 | End: 2021-06-30

## 2019-05-07 RX ORDER — DEXAMETHASONE SODIUM PHOSPHATE 4 MG/ML
INJECTION, SOLUTION INTRA-ARTICULAR; INTRALESIONAL; INTRAMUSCULAR; INTRAVENOUS; SOFT TISSUE PRN
Status: DISCONTINUED | OUTPATIENT
Start: 2019-05-07 | End: 2019-05-07

## 2019-05-07 RX ORDER — SODIUM CHLORIDE, SODIUM LACTATE, POTASSIUM CHLORIDE, CALCIUM CHLORIDE 600; 310; 30; 20 MG/100ML; MG/100ML; MG/100ML; MG/100ML
INJECTION, SOLUTION INTRAVENOUS CONTINUOUS PRN
Status: DISCONTINUED | OUTPATIENT
Start: 2019-05-07 | End: 2019-05-07

## 2019-05-07 RX ORDER — SODIUM CHLORIDE, SODIUM LACTATE, POTASSIUM CHLORIDE, CALCIUM CHLORIDE 600; 310; 30; 20 MG/100ML; MG/100ML; MG/100ML; MG/100ML
INJECTION, SOLUTION INTRAVENOUS CONTINUOUS
Status: DISCONTINUED | OUTPATIENT
Start: 2019-05-07 | End: 2019-05-07 | Stop reason: HOSPADM

## 2019-05-07 RX ORDER — MAGNESIUM HYDROXIDE 1200 MG/15ML
LIQUID ORAL PRN
Status: DISCONTINUED | OUTPATIENT
Start: 2019-05-07 | End: 2019-05-07 | Stop reason: HOSPADM

## 2019-05-07 RX ORDER — ONDANSETRON 4 MG/1
4 TABLET, FILM COATED ORAL EVERY 6 HOURS PRN
Status: ON HOLD | COMMUNITY
End: 2021-07-02

## 2019-05-07 RX ORDER — HYDROMORPHONE HYDROCHLORIDE 1 MG/ML
.3-.5 INJECTION, SOLUTION INTRAMUSCULAR; INTRAVENOUS; SUBCUTANEOUS EVERY 10 MIN PRN
Status: DISCONTINUED | OUTPATIENT
Start: 2019-05-07 | End: 2019-05-07 | Stop reason: HOSPADM

## 2019-05-07 RX ORDER — LIDOCAINE HYDROCHLORIDE 20 MG/ML
INJECTION, SOLUTION INFILTRATION; PERINEURAL PRN
Status: DISCONTINUED | OUTPATIENT
Start: 2019-05-07 | End: 2019-05-07

## 2019-05-07 RX ORDER — BUPIVACAINE HYDROCHLORIDE AND EPINEPHRINE 5; 5 MG/ML; UG/ML
INJECTION, SOLUTION EPIDURAL; INTRACAUDAL; PERINEURAL
Status: DISCONTINUED
Start: 2019-05-07 | End: 2019-05-07 | Stop reason: HOSPADM

## 2019-05-07 RX ORDER — NEOSTIGMINE METHYLSULFATE 1 MG/ML
VIAL (ML) INJECTION PRN
Status: DISCONTINUED | OUTPATIENT
Start: 2019-05-07 | End: 2019-05-07

## 2019-05-07 RX ORDER — CEFAZOLIN SODIUM 1 G/3ML
1 INJECTION, POWDER, FOR SOLUTION INTRAMUSCULAR; INTRAVENOUS SEE ADMIN INSTRUCTIONS
Status: DISCONTINUED | OUTPATIENT
Start: 2019-05-07 | End: 2019-05-07 | Stop reason: HOSPADM

## 2019-05-07 RX ORDER — TRAMADOL HYDROCHLORIDE 50 MG/1
50 TABLET ORAL ONCE
Status: COMPLETED | OUTPATIENT
Start: 2019-05-07 | End: 2019-05-07

## 2019-05-07 RX ORDER — GLYCOPYRROLATE 0.2 MG/ML
INJECTION, SOLUTION INTRAMUSCULAR; INTRAVENOUS PRN
Status: DISCONTINUED | OUTPATIENT
Start: 2019-05-07 | End: 2019-05-07

## 2019-05-07 RX ORDER — CEFAZOLIN SODIUM 2 G/100ML
2 INJECTION, SOLUTION INTRAVENOUS
Status: COMPLETED | OUTPATIENT
Start: 2019-05-07 | End: 2019-05-07

## 2019-05-07 RX ADMIN — FENTANYL CITRATE 25 MCG: 50 INJECTION, SOLUTION INTRAMUSCULAR; INTRAVENOUS at 12:42

## 2019-05-07 RX ADMIN — SODIUM CHLORIDE, POTASSIUM CHLORIDE, SODIUM LACTATE AND CALCIUM CHLORIDE: 600; 310; 30; 20 INJECTION, SOLUTION INTRAVENOUS at 12:09

## 2019-05-07 RX ADMIN — MIDAZOLAM 2 MG: 1 INJECTION INTRAMUSCULAR; INTRAVENOUS at 12:09

## 2019-05-07 RX ADMIN — ONDANSETRON 4 MG: 2 INJECTION INTRAMUSCULAR; INTRAVENOUS at 12:51

## 2019-05-07 RX ADMIN — FENTANYL CITRATE 50 MCG: 50 INJECTION, SOLUTION INTRAMUSCULAR; INTRAVENOUS at 13:10

## 2019-05-07 RX ADMIN — PROPOFOL 30 MG: 10 INJECTION, EMULSION INTRAVENOUS at 12:36

## 2019-05-07 RX ADMIN — NEOSTIGMINE METHYLSULFATE 2.5 MG: 1 INJECTION, SOLUTION INTRAVENOUS at 12:54

## 2019-05-07 RX ADMIN — LIDOCAINE HYDROCHLORIDE 100 MG: 20 INJECTION, SOLUTION INFILTRATION; PERINEURAL at 12:15

## 2019-05-07 RX ADMIN — ROCURONIUM BROMIDE 40 MG: 10 INJECTION INTRAVENOUS at 12:23

## 2019-05-07 RX ADMIN — FENTANYL CITRATE 50 MCG: 50 INJECTION, SOLUTION INTRAMUSCULAR; INTRAVENOUS at 14:27

## 2019-05-07 RX ADMIN — CEFAZOLIN SODIUM 2 G: 2 INJECTION, SOLUTION INTRAVENOUS at 12:20

## 2019-05-07 RX ADMIN — SUCCINYLCHOLINE CHLORIDE 100 MG: 20 INJECTION, SOLUTION INTRAMUSCULAR; INTRAVENOUS; PARENTERAL at 12:15

## 2019-05-07 RX ADMIN — FENTANYL CITRATE 25 MCG: 50 INJECTION, SOLUTION INTRAMUSCULAR; INTRAVENOUS at 12:41

## 2019-05-07 RX ADMIN — FENTANYL CITRATE 100 MCG: 50 INJECTION, SOLUTION INTRAMUSCULAR; INTRAVENOUS at 12:15

## 2019-05-07 RX ADMIN — HYDROMORPHONE HYDROCHLORIDE 0.5 MG: 1 INJECTION, SOLUTION INTRAMUSCULAR; INTRAVENOUS; SUBCUTANEOUS at 12:31

## 2019-05-07 RX ADMIN — DEXAMETHASONE SODIUM PHOSPHATE 4 MG: 4 INJECTION, SOLUTION INTRA-ARTICULAR; INTRALESIONAL; INTRAMUSCULAR; INTRAVENOUS; SOFT TISSUE at 12:23

## 2019-05-07 RX ADMIN — SODIUM CHLORIDE, POTASSIUM CHLORIDE, SODIUM LACTATE AND CALCIUM CHLORIDE: 600; 310; 30; 20 INJECTION, SOLUTION INTRAVENOUS at 12:58

## 2019-05-07 RX ADMIN — GLYCOPYRROLATE 0.5 MG: 0.2 INJECTION, SOLUTION INTRAMUSCULAR; INTRAVENOUS at 12:51

## 2019-05-07 RX ADMIN — Medication 0.5 MG: at 13:47

## 2019-05-07 RX ADMIN — NEOSTIGMINE METHYLSULFATE 2.5 MG: 1 INJECTION, SOLUTION INTRAVENOUS at 12:51

## 2019-05-07 RX ADMIN — PROPOFOL 30 MCG/KG/MIN: 10 INJECTION, EMULSION INTRAVENOUS at 12:15

## 2019-05-07 RX ADMIN — TRAMADOL HYDROCHLORIDE 50 MG: 50 TABLET, COATED ORAL at 15:05

## 2019-05-07 RX ADMIN — PROPOFOL 200 MG: 10 INJECTION, EMULSION INTRAVENOUS at 12:15

## 2019-05-07 RX ADMIN — GLYCOPYRROLATE 0.4 MG: 0.2 INJECTION, SOLUTION INTRAMUSCULAR; INTRAVENOUS at 12:54

## 2019-05-07 RX ADMIN — KETOROLAC TROMETHAMINE 30 MG: 30 INJECTION, SOLUTION INTRAMUSCULAR at 13:12

## 2019-05-07 ASSESSMENT — MIFFLIN-ST. JEOR: SCORE: 1759.63

## 2019-05-07 NOTE — BRIEF OP NOTE
[facility]    Brief Operative Note    Pre-operative diagnosis: GALLSTONES  Post-operative diagnosis Cholelithiasis  Procedure: Procedure(s):  CHOLECYSTECTOMY, LAPAROSCOPIC  Surgeon: Surgeon(s) and Role:     * Kannan Cuevas MD - Primary     * Pamela Heaton PA-C - Assisting  Anesthesia: General   Estimated blood loss: 5 mL  Drains: None  Specimens:   ID Type Source Tests Collected by Time Destination   A : Gallbladder and Contents Tissue Gallbladder and Contents SURGICAL PATHOLOGY EXAM Kannan Cuevas MD 5/7/2019 12:44 PM      Findings:   same as above.  Complications: None.  Implants:  * No implants in log *

## 2019-05-07 NOTE — ANESTHESIA CARE TRANSFER NOTE
Patient: Stacey Bhandari    Procedure(s):  CHOLECYSTECTOMY, LAPAROSCOPIC    Diagnosis: GALLSTONES  Diagnosis Additional Information: No value filed.    Anesthesia Type:   General, ETT, RSI     Note:  Airway :Face Mask  Patient transferred to:PACU  Comments: Neuromuscular blockade reversed after TOF 4/4, spontaneous respirations, adequate tidal volumes, followed commands to voice, oropharynx suctioned with soft flexible catheter, extubated atraumatically, airway patent after extubation.  Oxygen via facemask at 10 liters per minute to PACU. Oxygen tubing connected to wall O2 in PACU, SpO2, NiBP, and EKG monitors and alarms on and functioning, Saw Hugger warmer connected to patient gown, report on patient's clinical status given to PACU RN, RN questions answered. Handoff Report: Identifed the Patient, Identified the Reponsible Provider, Reviewed the pertinent medical history, Discussed the surgical course, Reviewed Intra-OP anesthesia mangement and issues during anesthesia, Set expectations for post-procedure period and Allowed opportunity for questions and acknowledgement of understanding      Vitals: (Last set prior to Anesthesia Care Transfer)    CRNA VITALS  5/7/2019 1236 - 5/7/2019 1313      5/7/2019             NIBP:  147/115  (Abnormal)     NIBP Mean:  124                Electronically Signed By: KANIKA Chun CRNA  May 7, 2019  1:13 PM

## 2019-05-07 NOTE — ANESTHESIA PREPROCEDURE EVALUATION
Anesthesia Pre-Procedure Evaluation    Patient: Stacey BARAKAT Ipekci   MRN: [id number] : 1990          Preoperative Diagnosis: GALLSTONES    Procedure(s):  CHOLECYSTECTOMY, LAPAROSCOPIC    Past Medical History:   Diagnosis Date     Gestational diabetes mellitus (GDM) in childbirth, insulin controlled 3/22/2019     Past Surgical History:   Procedure Laterality Date     THYROIDECTOMY N/A 2016    Procedure: THYROIDECTOMY;  Surgeon: Sanjuana Vivas MD;  Location: [facility] OR     TONSILLECTOMY         Anesthesia Evaluation     .             ROS/MED HX    ENT/Pulmonary:  - neg pulmonary ROS     Neurologic:  - neg neurologic ROS     Cardiovascular:  - neg cardiovascular ROS       METS/Exercise Tolerance:     Hematologic:  - neg hematologic  ROS       Musculoskeletal:  - neg musculoskeletal ROS       GI/Hepatic:     (+) cholecystitis/cholelithiasis,       Renal/Genitourinary:  - ROS Renal section negative       Endo: Comment: H/o gestational DM        Psychiatric:  - neg psychiatric ROS       Infectious Disease:  - neg infectious disease ROS       Malignancy:         Other:                          Physical Exam  Normal systems: cardiovascular, pulmonary and dental    Airway   Mallampati: II  TM distance: >3 FB  Neck ROM: full    Dental     Cardiovascular       Pulmonary             Lab Results   Component Value Date    WBC 19.7 (H) 2019    HGB 12.3 2019    HCT 37.3 2019     2019    CR 0.57 2019    ALT 18 2019    AST 22 2019    HCG Negative 2019       Preop Vitals  BP Readings from Last 3 Encounters:   19 140/78   19 130/80   19 132/85    Pulse Readings from Last 3 Encounters:   19 72   19 95   16 76      Resp Readings from Last 3 Encounters:   19 18   19 18   16 16    SpO2 Readings from Last 3 Encounters:   19 97%   19 97%   16 96%      Temp Readings from Last 1 Encounters:   19 36.1  C  "(97  F) (Oral)    Ht Readings from Last 1 Encounters:   05/07/19 1.651 m (5' 5\")      Wt Readings from Last 1 Encounters:   05/07/19 103.4 kg (227 lb 14.4 oz)    Estimated body mass index is 37.92 kg/m  as calculated from the following:    Height as of this encounter: 1.651 m (5' 5\").    Weight as of this encounter: 103.4 kg (227 lb 14.4 oz).       Anesthesia Plan      History & Physical Review  History and physical reviewed and following examination; no interval change.    ASA Status:  2 .    NPO Status:  > 8 hours    Plan for General, ETT and RSI with Intravenous induction. Maintenance will be Balanced.    PONV prophylaxis:  Ondansetron (or other 5HT-3) and Dexamethasone or Solumedrol  Additional equipment: Videolaryngoscope Background propofol gtt      Postoperative Care  Postoperative pain management:  IV analgesics.      Consents  Anesthetic plan, risks, benefits and alternatives discussed with:  Patient..                 Nestor Sykes, DO, DO  "

## 2019-05-07 NOTE — OP NOTE
General Surgery Operative Note    PREOPERATIVE DIAGNOSIS:  GALLSTONES, cholecystitis    POSTOPERATIVE DIAGNOSIS:  Same    PROCEDURE:   Procedure(s):  CHOLECYSTECTOMY, LAPAROSCOPIC    ANESTHESIA:  General.      SURGEON:  Kannan Cuevas MD    ASSISTANT:  Pamela Heaton PA-C and Valentin Martin PA-C. The physician assistant was medically necessary for their expertise in camera management, suctioning, and retraction    INDICATIONS:  The patient has marked abdominal pain and gallstones The risks, including but not limited to bleeding, infection, bile duct or bowel injury, anesthesia, and the possible need for an open approach were reviewed. The patient appeared to understand and wished to proceed with operation.    PROCEDURE:  The patient was taken to the operating suite.  The operative area was prepped and draped in a sterile fashion.  Surgeon initiated timeout was acknowledged.      Under general anesthesia the abdomen was insufflated through a periumbilical incision with a veress needle. Over 3 liters were place with low pressures. A 5mm trocar was placed. There was no injury seen when the camera was placed. Under direct vision a 5mm trocar was placed in the right upper quadrant and an 11mm trocar placed below the xiphoid. The gallbladder was grasped and adhesions taken down with blunt dissection and cautery. The peritoneal surfaces of the critical angle were cauterized posteriorly and anteriorly. The critical angle was dissected out, until there were only two structures remaining. Once these structures were identified, the duct was doubly clipped proximally, singly clipped distally and divided. The cystic artery was double clipped proximally, singly clipped distally and divided. The gallbladder was dissected off its bed with cautery from medial to lateral. The gallbladder was set aside and hemostasis assured on the liver. Irrigation was used and suctioned out. The bed was dry and the clips were intact. The gallbladder  was removed through the larger incision, which was enlarged as needed to permit passage of the gallbladder. We then irrigated again, and saw no sign of blood of bile leak. We checked for veress needle injury, there was none. The large trocar was removed and the fascia closed with 0 Vicryl. Marcaine was instilled. Gas was suctioned out. Trocars were removed. Sponge count was reported as correct. All incisions were closed with 4-0 Vicryl and steri-strips.            INTRAOPERATIVE FINDINGS:  Inflamed gallbladder    Kannan Cuevas

## 2019-05-07 NOTE — OR NURSING
Pt dressed and up to recliner.  Pt tolerated getting up to recliner well.  Pt transferred to phase 2. Glasses returned to patient.

## 2019-05-07 NOTE — ANESTHESIA POSTPROCEDURE EVALUATION
Patient: Stacey Bhandari    Procedure(s):  CHOLECYSTECTOMY, LAPAROSCOPIC    Diagnosis:GALLSTONES  Diagnosis Additional Information: No value filed.    Anesthesia Type:  General, ETT, RSI    Note:  Anesthesia Post Evaluation    Patient location during evaluation: bedside  Patient participation: Able to fully participate in evaluation  Level of consciousness: awake  Pain management: adequate  Airway patency: patent  Cardiovascular status: acceptable  Respiratory status: acceptable  Hydration status: acceptable  PONV: none     Anesthetic complications: None    Comments: No anesthetic complications noted.         Last vitals:  Vitals:    05/07/19 1315 05/07/19 1330 05/07/19 1345   BP: 146/78 148/81 145/79   Pulse: 66 63 64   Resp: 16 17 17   Temp: 37.1  C (98.8  F)     SpO2: 100% 100% 97%         Electronically Signed By: Nestor Sykes DO, DO  May 7, 2019  2:07 PM

## 2019-05-07 NOTE — DISCHARGE INSTRUCTIONS
Same Day Surgery Discharge Instructions for  Sedation and General Anesthesia       It's not unusual to feel dizzy, light-headed or faint for up to 24 hours after surgery or while taking pain medication.  If you have these symptoms: sit for a few minutes before standing and have someone assist you when you get up to walk or use the bathroom.      You should rest and relax for the next 24 hours. We recommend you make arrangements to have an adult stay with you for at least 24 hours after your discharge.  Avoid hazardous and strenuous activity.      DO NOT DRIVE any vehicle or operate mechanical equipment for 24 hours following the end of your surgery.  Even though you may feel normal, your reactions may be affected by the medication you have received.      Do not drink alcoholic beverages for 24 hours following surgery.       Slowly progress to your regular diet as you feel able. It's not unusual to feel nauseated and/or vomit after receiving anesthesia.  If you develop these symptoms, drink clear liquids (apple juice, ginger ale, broth, 7-up, etc. ) until you feel better.  If your nausea and vomiting persists for 24 hours, please notify your surgeon.        All narcotic pain medications, along with inactivity and anesthesia, can cause constipation. Drinking plenty of liquids and increasing fiber intake will help.      For any questions of a medical nature, call your surgeon.      Do not make important decisions for 24 hours.      If you had general anesthesia, you may have a sore throat for a couple of days related to the breathing tube used during surgery.  You may use Cepacol lozenges to help with this discomfort.  If it worsens or if you develop a fever, contact your surgeon.       If you feel your pain is not well managed with the pain medications prescribed by your surgeon, please contact your surgeon's office to let them know so they can address your concerns.     **If you have questions or concerns about your  procedure,   call Dr Cuevas at  [phone]**    Today you received Toradol, an antiinflammatory medication similar to Ibuprofen.  You should not take other antiinflammatory medication, such as Ibuprofen, Motrin, Advil, Aleve, Naprosyn, etc until 7:15pm.             [facility]  Discharge Instructions: Post-Operative Laparoscopic Cholecystectomy    ACTIVITY    Expect to feel tired after your surgery.  This will gradually resolve.      Take frequent, short walks and increase your activity gradually.      Avoid strenuous physical activity or heavy lifting greater than 20 lbs. for 2 weeks.  You may climb stairs.    You may drive without restrictions when you are not using any prescription pain medication and feel comfortable in a car.    You may return to work/school when you are comfortable without any prescription pain medication.    WOUND CARE    You may remove your outer dressing or Band-Aids and shower 48 hours after the surgery.  Pat your incisions dry and leave them open to air.  Re-apply dressing (Band-Aids or gauze/tape) as needed for comfort or drainage.    You may have steri-strips (looks like white tape) on your incision.  You may peel off the steri-strips 1 week after your surgery if they have not peeled off on their own.     Do not soak your incisions in a tub or pool for 2 weeks.     Do not apply any lotions, creams, or ointments to your incisions.    A ridge under your incisions is normal and will gradually resolve.    DIET    Start with liquids, then gradually resume your regular diet as tolerated.  Avoid heavy, spicy, and greasy meals for 2-3 days.    Drink plenty of fluids to stay hydrated.    It is not uncommon to experience some loose stools or diarrhea after surgery.  This is your body s way of adapting to the bile which will slowly drain into your intestine.  A low fat diet may help with this.  This should improve over 1-2 months.    PAIN    Expect some  tenderness and discomfort at the incision sites.  Use the prescribed pain medication at your discretion.  Expect gradual resolution of your pain over several days.    You may take ibuprofen with food (unless you have been told not to) instead of or in addition to your prescribed pain medication.  If you are taking Norco or Percocet, do not take any additional acetaminophen/APAP/Tylenol.    Do not drink alcohol or drive while you are taking pain medications.    You may apply ice to your incisions in 20 minute intervals as needed for the next 48 hours.  After that time, consider switching to heat if you prefer.    EXPECTATIONS    Pain medications can cause constipation.  Limit use when possible.  Take over the counter stool softener/stimulant, such as Colace or Senna, 1-2 times a day with plenty of water.  You may take a mild over the counter laxative, such as Miralax or a suppository, as needed.  You may discontinue these medications once you are having regular bowel movements and/or are no longer taking your narcotic pain medication.      You may have shoulder or upper back discomfort due to the gas used in surgery.  This is temporary and should resolve in 48-72 hours.  Short, frequent walks may help with this.    FOLLOW UP    Our office will contact you approximately 2-3 weeks to check on your progress and answer any questions you may have.  If you are doing well, you will not need to return for a follow up appointment.  If any concerns are identified over the phone, we will help you make an appointment to see a provider.     If you have not received a phone call, have any questions or concerns, or would like to be seen, please call us at [phone] and ask to speak with our nurse.  We are located at [address] [place] [address].    CALL OUR OFFICE -[phone] IF YOU HAVE:     Chills or fever above 101 F.    Increased redness, warmth, or drainage at your incisions.    Significant  bleeding.    Pain not relieved by your pain medication or rest.    Increasing pain after the first 48 hours.    Any other concerns or questions.    Revised January 2018

## 2019-05-08 LAB — COPATH REPORT: NORMAL

## 2019-05-09 NOTE — PROGRESS NOTES
"[facility]  Surgery Consultation    PCP:  Mariana Reynolds [phone]    HPI: Patient is a 29-year-old female referred by the above-mentioned provider for consultation regarding cholelithiasis.  She recently developed epigastric and right upper quadrant abdominal pain.  This is necessitated multiple visits to emergency department for evaluation.  She is been found to have cholelithiasis.  She is persisted in having relatively intense right upper quadrant abdominal pain.  This is worse after meals.  She said no nausea or vomiting.  No fevers or chills.    PMH:   has a past medical history of Gestational diabetes mellitus (GDM) in childbirth, insulin controlled (3/22/2019).  PSH:    has a past surgical history that includes tonsillectomy; Thyroidectomy (N/A, 12/16/2016); and Laparoscopic cholecystectomy (N/A, 5/7/2019).  Social History:   reports that she has never smoked. She has never used smokeless tobacco. She reports that she drinks alcohol. She reports that she does not use drugs.  Family History:  family history includes Coronary Artery Disease in her maternal grandfather and maternal grandmother; Diabetes in her father; Hyperlipidemia in her maternal grandfather, maternal grandmother, and mother; Hypertension in her maternal grandmother and mother; Unknown/Adopted in her paternal grandfather and paternal grandmother.  Medications/Allergies: Home medications and allergies reviewed.    ROS:  The 10 point Review of Systems is negative other than noted in the HPI.    Physical Exam:  /80   Pulse 95   Ht 1.651 m (5' 5\")   Wt 103.4 kg (228 lb)   BMI 37.94 kg/m    GENERAL: Generally appears well.  Psych: Alert and Oriented.  Normal affect  Eyes: Sclera clear  Respiratory:  Lungs clear to ausculation bilaterally with good air excursion  Cardiovascular:  Regular Rate and Rhythm with no murmurs gallops or rubs, normal peripheral pulses  GI: Abdomen Non Distended Mild tenderness to " palpation RUQ No hernias palpated.  Lymphatic/Hematologic/Immune:  No femoral or cervical lymphadenopathy.  Integumentary:  No rashes  Neurological: grossly intact    Ultrasound shows Cholelithiasis No gall bladder wall thicking  No pericholecystic fluid   All new lab and imaging data was reviewed.     Impression and Plan:  Patient is a 29 year old female with cholelithiasis.    PLAN:   I discussed the pathophysiology of gallbladder disease and complications of cholecystitis and choledocholithiasis with the patient.  Patient is an appropriate candidate for cholecystectomy.  She wishes to have this performed as soon as possible and due to scheduling I am unavailable to do that.  Her surgery will therefore be scheduled with a partner who is more available and will take place the day after this consultation.  I also discussed the risks associated with the procedure including, but not limited to infection, bleeding, conversion to open, bile leak, bile duct injury, retained gallstones, pneumonia, MI, and anesthesia complications with the patient.  I also discussed if a complication did occur it may require further surgical intervention during or after the procedure. The patient indicated understanding of the discussion, asked appropriate questions, and provided consent. I provided the patient an information pamphlet. I have recommended a low fat diet and instructed the patient to go to ER if they developed persistent pain, persistent nausea and vomiting, or yellowness of skin.      Thank you very much for this consult.    Cas Ramsay M.D.  [facility]  [phone]    Please route or send letter to:  Primary Care Provider (PCP) and Referring Provider

## 2019-05-24 ENCOUNTER — TELEPHONE (OUTPATIENT)
Dept: SURGERY | Facility: CLINIC | Age: 29
End: 2019-05-24

## 2019-05-24 NOTE — TELEPHONE ENCOUNTER
Attempted to call patient for post op check.  No answer.  Message was left for patient to call back if they had any questions of concerns.     Pamela Heaton PA-C

## 2019-05-29 ENCOUNTER — TELEPHONE (OUTPATIENT)
Dept: SURGERY | Facility: CLINIC | Age: 29
End: 2019-05-29

## 2019-05-29 NOTE — TELEPHONE ENCOUNTER
Name of caller: Patient    Reason for Call:  Patient still having pain after surgery.  She is stating that her it burns by the incision area.  If she is laying down, it doesn't hurt, but any other time, it burns.    Surgeon:  Dr. Cuevas     Recent Surgery:  Yes.    If yes, when & what type:  5/7edouard      Best phone number to reach pt at is: [phone]  Ok to leave a message with medical info? Yes.    Pharmacy preferred (if calling for a refill): N/A

## 2020-12-03 LAB
ABO + RH BLD: NORMAL
ABO + RH BLD: NORMAL
BLD GP AB SCN SERPL QL: NEGATIVE
HBV SURFACE AG SERPL QL IA: NEGATIVE
HIV 1+2 AB+HIV1 P24 AG SERPL QL IA: NORMAL
RUBELLA ABY IGG: NORMAL
TREPONEMA ANTIBODIES: NORMAL

## 2021-01-15 ENCOUNTER — HEALTH MAINTENANCE LETTER (OUTPATIENT)
Age: 31
End: 2021-01-15

## 2021-04-28 ENCOUNTER — TELEPHONE (OUTPATIENT)
Dept: CONSULT | Facility: CLINIC | Age: 31
End: 2021-04-28

## 2021-04-28 NOTE — TELEPHONE ENCOUNTER
Diabetes Education Scheduling Outreach #1:    Call to patient to schedule. Left message with phone number to call to schedule.    Plan for 2nd outreach attempt within 1 business day.    Lulu Siddiqui OnCall  Diabetes and Nutrition Scheduling

## 2021-04-29 ENCOUNTER — VIRTUAL VISIT (OUTPATIENT)
Dept: EDUCATION SERVICES | Facility: CLINIC | Age: 31
End: 2021-04-29
Payer: COMMERCIAL

## 2021-04-29 PROCEDURE — G0108 DIAB MANAGE TRN  PER INDIV: HCPCS | Mod: 95

## 2021-04-29 RX ORDER — BLOOD-GLUCOSE METER
1 EACH MISCELLANEOUS DAILY
Qty: 1 KIT | Refills: 0 | Status: SHIPPED | OUTPATIENT
Start: 2021-04-29

## 2021-04-29 RX ORDER — BLOOD SUGAR DIAGNOSTIC
STRIP MISCELLANEOUS
Qty: 300 STRIP | Refills: 4 | Status: SHIPPED | OUTPATIENT
Start: 2021-04-29

## 2021-04-29 RX ORDER — LANCETS 33 GAUGE
1 EACH MISCELLANEOUS 4 TIMES DAILY
Qty: 100 EACH | Refills: 4 | Status: SHIPPED | OUTPATIENT
Start: 2021-04-29

## 2021-04-29 NOTE — PROGRESS NOTES
Diabetes Self-Management Education & Support    SUBJECTIVE/OBJECTIVE:  Presents for education related to gestational diabetes.    Accompanied by: Self  Diabetes management related comments/concerns: I just need a review of GDM again since my last pregnancy - hoping to avoid insulin this time  Gestational weeks: 28  Previously had Gestational Diabetes: Yes  Had Diabetes Education before: Yes  Previous insulin or other diabetes medication during that preganancy: Yes    Cultural Influences/Ethnic Background:  American    Estimated Date of Delivery: Data Unavailable    1 hour OGTT  No results found for: GLU1    3 hour OGTT    Fasting  No results found for: GLF    1 hour  No results found for: GL1    2 hour  No results found for: GL2    3 hour  No results found for: GL3    Lifestyle and Health Behaviors:  Exercise:: Yes  Meal planning/habits: Avoiding sweets, Smaller portions  Meals include: Breakfast, Lunch, Dinner, Evening Snack  Beverages: Water  Biggest challenges to healthy eating: Emotional eating  Pre-jh vitamin?: Yes  Experiencing nausea?: No  Experiencing heartburn?: No    Healthy Coping:  Emotional response to diabetes: Ready to learn  Informal Support system:: Spouse  Stage of change: PREPARATION (Decided to change - considering how)    Current Management:  Taking medications for gestational diabetes?: No    ASSESSMENT:  Had GDM in 2019 with us, needing NPH/Novolog at that time. Is hoping to avoid insulin this time if possible, however does know that she is ok with insulin again if it is needed.     INTERVENTION:  Patient was instructed on One Touch Verio Flex meter and was sent a iNEWiTt video on how to use this meter    Educational topics covered today:  GDM diagnosis, pathophysiology, Risks and Complications of GDM, Means of controlling GDM, Using a Blood Glucose Monitor, Blood Glucose Goals, Logging and Interpreting Glucose Results, Ketone Testing, When to Call a Diabetes Educator or OB Provider,  Healthy Eating During Pregnancy, Counting Carbohydrates, Meal Planning for GDM, and Physical Activity    Educational materials provided today:   Artur Understanding Gestational Diabetes  GDM Log Book  Sharps Disposal  Care After Delivery    Pt verbalized understanding of concepts discussed and recommendations provided today.     PLAN:  Check glucose 4 times daily, before breakfast and 1 hour after each meal.     Check Ketones daily for one week, if negative, reduce testing to once a week.     Physical activity recommended: as able.    Meal plan: 2-3 carbs at breakfast, 3-4 carbs at lunch, 3-4 carbs at supper, 1-2 carbs at 3 snacks a day.  Follow consistent CHO meal plan, eat CHO and protein/fat at all meals/snacks.    Call/e-mail/ChirpVisionhart message diabetes educator if 3 or more blood sugars are above the goal in 1 week, if ketones are positive, or with questions/concerns.    MART Hand CDE  Time Spent: 49 minutes  Encounter Type: Individual    Any diabetes medication dose changes were made via the CDE Protocol and Collaborative Practice Agreement with the patient's OB/GYN provider. A copy of this encounter was shared with the provider.

## 2021-04-30 ENCOUNTER — MYC MEDICAL ADVICE (OUTPATIENT)
Dept: EDUCATION SERVICES | Facility: CLINIC | Age: 31
End: 2021-04-30

## 2021-05-03 NOTE — TELEPHONE ENCOUNTER
MyChart response sent. Will  BG meter today.    Alessandra Grider RN, [facility]

## 2021-05-04 ENCOUNTER — MYC MEDICAL ADVICE (OUTPATIENT)
Dept: EDUCATION SERVICES | Facility: CLINIC | Age: 31
End: 2021-05-04

## 2021-05-04 NOTE — TELEPHONE ENCOUNTER
MyChart response sent.    Alessandra Grider RN, [facility]

## 2021-05-06 ENCOUNTER — TELEPHONE (OUTPATIENT)
Dept: EDUCATION SERVICES | Facility: CLINIC | Age: 31
End: 2021-05-06
Payer: COMMERCIAL

## 2021-05-06 ENCOUNTER — MYC MEDICAL ADVICE (OUTPATIENT)
Dept: EDUCATION SERVICES | Facility: CLINIC | Age: 31
End: 2021-05-06

## 2021-05-06 DIAGNOSIS — Z53.9 ERRONEOUS ENCOUNTER--DISREGARD: Primary | ICD-10-CM

## 2021-05-06 NOTE — LETTER
5/6/2021         RE: Staecy Bhandari  [address] [place] [facility] [id number]        Dear Colleague,    Thank you for referring your patient, Stacey Bhandari, to the [facility]. Please see a copy of my visit note below.    Patient likely needs insulin by 5/10  - would you approve up to 0.2u/kg/day of NPH at night to help with fasting blood sugars? Please fax back reply to be able to allow us to order insulin if needed Monday.  Thank you!  MART Hand CDE    No notes on file  Gestational Diabetes Follow-up     Subjective/Objective:     Stacey Bhandari sent in blood glucose log for review. Last date of communication was: 4/29/2021.     Gestational diabetes is being managed with diet and activity     Taking diabetes medications: no     Estimated Date of Delivery: Data Unavailable     BG/Food Log:                Assessment:     Ketones: na.   Fasting blood glucoses: 0% in target.  After breakfast: 0% in target.  After lunch: 100% in target.  After dinner: 50% in target.     Patient is starting with fasting blood sugars that are quite high. She may be able to make some improvements to her bedtime snack, but unsure if that would actually lead to fasting numbers <96. We are scheduled for follow up on Monday, so will give patient some advice on bedtime snacks (and some meal ideas), and hint at possible need for insulin. Will seek approval for insulin if needed Monday so that we can teach insulin if it is needed by that follow up.     Plan/Response:  Follow-up on Monday.     MART Hand CDE     Any diabetes medication dose changes were made via the CDE Protocol and Collaborative Practice Agreement with the patient's OB/GYN provider. A copy of this encounter was shared with the provider.

## 2021-05-06 NOTE — TELEPHONE ENCOUNTER
Gestational Diabetes Follow-up    Subjective/Objective:    Stacey BARAKAT Zackben sent in blood glucose log for review. Last date of communication was: 4/29/2021.    Gestational diabetes is being managed with diet and activity    Taking diabetes medications: no    Estimated Date of Delivery: Data Unavailable    BG/Food Log:               Assessment:    Ketones: na.   Fasting blood glucoses: 0% in target.  After breakfast: 0% in target.  After lunch: 100% in target.  After dinner: 50% in target.    Patient is starting with fasting blood sugars that are quite high. She may be able to make some improvements to her bedtime snack, but unsure if that would actually lead to fasting numbers <96. We are scheduled for follow up on Monday, so will give patient some advice on bedtime snacks (and some meal ideas), and hint at possible need for insulin. Will seek approval for insulin if needed Monday so that we can teach insulin if it is needed by that follow up (sent via Communication Management efax).     Plan/Response:  Follow-up on Monday.    MART Hand CDE

## 2021-05-10 ENCOUNTER — VIRTUAL VISIT (OUTPATIENT)
Dept: EDUCATION SERVICES | Facility: CLINIC | Age: 31
End: 2021-05-10
Payer: COMMERCIAL

## 2021-05-10 DIAGNOSIS — Z53.9 ERRONEOUS ENCOUNTER--DISREGARD: Primary | ICD-10-CM

## 2021-05-10 PROCEDURE — 98968 PH1 ASSMT&MGMT NQHP 21-30: CPT | Mod: 95

## 2021-05-10 RX ORDER — INSULIN HUMAN 100 [IU]/ML
20 INJECTION, SUSPENSION SUBCUTANEOUS AT BEDTIME
Qty: 15 ML | Refills: 3 | Status: SHIPPED | OUTPATIENT
Start: 2021-05-10 | End: 2021-05-14

## 2021-05-10 RX ORDER — PEN NEEDLE, DIABETIC 32GX 5/32"
NEEDLE, DISPOSABLE MISCELLANEOUS
Qty: 90 EACH | Refills: 3 | Status: SHIPPED | OUTPATIENT
Start: 2021-05-10

## 2021-05-10 NOTE — PROGRESS NOTES
Diabetes and Pregnancy Follow-up  Type of Service: Telephone Visit    How would patient like to obtain AVS? Not needed    Subjective/Objective:    Stacey Bhandari was called for a scheduled BG review. Last date of communication was: 5/6/2021.    Gestational diabetes is being managed with diet and activity    Taking diabetes medications: no    Estimated Date of Delivery: Data Unavailable    Blood Glucose/Ketone Log:          Assessment:    Ketones: trace/neg.   Fasting blood glucoses: 0% in target.  After breakfast: 43% in target.  After lunch: 85% in target.  After dinner: 33% in target.    Spoke with nurse Ritter at [facility] -- they were ok for start of insulin, faxed back approval for up to 0.2u/kg/day.  Most recent weight was 237#.    Spoke with patient, who was understanding of the need to start insulin. She was hoping to avoid it, but did need it last pregnancy too. She used pens last time, so ordered pen insulin today as well. Her  gave all injections last time, and plans to have him do them this time around as well. She had a lot of birthday dinners this week, and is hopeful that her after dinner numbers will improve as she can make her own meals again this week.     Plan/Response:  Recommend that patient begin NPH insulin -Start 20 units (0.2u/kg/day) NPH at bedtime to help with fasting blood sugars.   Follow-up in 3-4 days.    MART Hand CDE  Time Spent: 23:20 minutes    Any diabetes medication dose changes were made via the CDE Protocol and Collaborative Practice Agreement with the patient's OB/GYN provider. A copy of this encounter was shared with the provider.

## 2021-05-14 ENCOUNTER — MYC MEDICAL ADVICE (OUTPATIENT)
Dept: EDUCATION SERVICES | Facility: CLINIC | Age: 31
End: 2021-05-14

## 2021-05-14 RX ORDER — INSULIN HUMAN 100 [IU]/ML
24 INJECTION, SUSPENSION SUBCUTANEOUS AT BEDTIME
Qty: 15 ML | Refills: 3 | Status: SHIPPED | OUTPATIENT
Start: 2021-05-14 | End: 2021-05-18

## 2021-05-14 NOTE — TELEPHONE ENCOUNTER
Gestational Diabetes Follow-up    Subjective/Objective:    Stacey Bhandari sent in blood glucose log for review. Last date of communication was: 5/10.    Gestational diabetes is being managed with medications    Taking diabetes medications:   yes:     Diabetes Medication(s)     Insulin       insulin NPH (HUMULIN N KWIKPEN) 100 UNIT/ML injection    Inject 20 Units Subcutaneous At Bedtime          Estimated Date of Delivery: Data Unavailable    BG/Food Log:       Assessment:    Ketones: neg.   Fasting blood glucoses: 0% in target.  After breakfast: 75% in target.  Before lunch: -% in target.  After lunch: 66% in target.  Before dinner: -% in target.  After dinner: 100% in target.    Would benefit from raising her NPH at bedtime. Post meal numbers are looking improved though the past couple days.     Plan/Response:  Recommend increase to insulin - increase NPH 0-0-0-20 --> 0-0-0-24 (20% increase per protocol).  [facility]     MART Marroquin CDE      Any diabetes medication dose changes were made via the CDE Protocol and Collaborative Practice Agreement with the patient's OB/GYN provider. A copy of this encounter was shared with the provider.

## 2021-05-18 ENCOUNTER — MYC MEDICAL ADVICE (OUTPATIENT)
Dept: EDUCATION SERVICES | Facility: CLINIC | Age: 31
End: 2021-05-18

## 2021-05-18 RX ORDER — INSULIN HUMAN 100 [IU]/ML
28 INJECTION, SUSPENSION SUBCUTANEOUS AT BEDTIME
Qty: 15 ML | Refills: 3 | Status: SHIPPED | OUTPATIENT
Start: 2021-05-18 | End: 2021-05-21

## 2021-05-18 NOTE — TELEPHONE ENCOUNTER
Gestational Diabetes Follow-up    Subjective/Objective:    Stacey BARAKAT Zackben sent in blood glucose log for review. Last date of communication was: 5/14/2021.    Gestational diabetes is being managed with medications    Taking diabetes medications:   yes:     Diabetes Medication(s)     Insulin       insulin NPH (HUMULIN N KWIKPEN) 100 UNIT/ML injection    Inject 24 Units Subcutaneous At Bedtime          Estimated Date of Delivery: Data Unavailable    BG/Food Log:       Assessment:    Ketones: neg.   Fasting blood glucoses: 0% in target.  After breakfast: 60% in target.  After lunch: 100% in target.  After dinner: 100% in target.    Breakfast numbers will likely improve as fasting numbers decrease    Plan/Response:  Recommend increase to insulin - NPH 0-0-0-24 ---> 0-0-0-0-28.  Follow up Friday    MART Hand CDE    Any diabetes medication dose changes were made via the CDE Protocol and Collaborative Practice Agreement with the patient's OB/GYN provider. A copy of this encounter was shared with the provider.

## 2021-05-21 ENCOUNTER — MYC MEDICAL ADVICE (OUTPATIENT)
Dept: EDUCATION SERVICES | Facility: CLINIC | Age: 31
End: 2021-05-21

## 2021-05-21 RX ORDER — INSULIN HUMAN 100 [IU]/ML
33 INJECTION, SUSPENSION SUBCUTANEOUS AT BEDTIME
Qty: 15 ML | Refills: 3 | Status: SHIPPED | OUTPATIENT
Start: 2021-05-21 | End: 2021-05-25

## 2021-05-21 NOTE — TELEPHONE ENCOUNTER
Gestational Diabetes Follow-up    Subjective/Objective:    Stacey Bhandari sent in blood glucose log for review. Last date of communication was: 5/18/21.    Gestational diabetes is being managed with diet, activity and medications    Taking diabetes medications:   yes:     Diabetes Medication(s)     Insulin       insulin NPH (HUMULIN N KWIKPEN) 100 UNIT/ML injection    Inject 28 Units Subcutaneous At Bedtime          Estimated Date of Delivery: 7/14/21, est 31 weeks    BG/Food Log:       Assessment:  Blood sugars are improving since last increase.  Recommend increase to bring numbers closer to target.  Post meal readings have additionally improved.     Ketones: negative.   Fasting blood glucoses: 25% in target.  After breakfast: 100% in target.  After lunch: 100% in target.  After dinner: 67% in target.    Plan/Response:  Recommend increase to insulin - Humulin 0-0-0-28 --> 0-0-0-33.  Follow up Tuesday via jose r Fabian MS, RD, LD, CDE    Any diabetes medication dose changes were made via the CDE Protocol and Collaborative Practice Agreement with the patient's OB/GYN provider. A copy of this encounter was shared with the provider.

## 2021-05-25 ENCOUNTER — MYC MEDICAL ADVICE (OUTPATIENT)
Dept: EDUCATION SERVICES | Facility: CLINIC | Age: 31
End: 2021-05-25

## 2021-05-25 RX ORDER — INSULIN HUMAN 100 [IU]/ML
39 INJECTION, SUSPENSION SUBCUTANEOUS AT BEDTIME
Qty: 15 ML | Refills: 3
Start: 2021-05-25 | End: 2021-06-03

## 2021-05-25 NOTE — TELEPHONE ENCOUNTER
Gestational Diabetes Follow-up    Subjective/Objective:    Stacey BARAKAT Zackben sent in blood glucose log for review. Last date of communication was: 5/21/21.    Gestational diabetes is being managed with diet, activity and medications    Taking diabetes medications:   yes:     Diabetes Medication(s)     Insulin       insulin NPH (HUMULIN N KWIKPEN) 100 UNIT/ML injection    Inject 33 Units Subcutaneous At Bedtime          Estimated Date of Delivery: Data Unavailable    BG/Food Log:         Assessment:    Ketones: trace.   Fasting blood glucoses: 20% in target.  After breakfast: 80% in target.  After lunch: 100% in target.  After dinner: 100% in target.    Plan/Response:  Recommend increase to insulin - NPH 0-0-0-33 --> 0-0-0-39, 20% increase per protocol  Follow-up in 3-4 days.  See [employer] message for patient communications    Jeannine Wadr RD, LD, [facility]     Any diabetes medication dose changes were made via the CDE Protocol and Collaborative Practice Agreement with the patient's OB/GYN provider. A copy of this encounter was shared with the provider.

## 2021-06-03 ENCOUNTER — MYC MEDICAL ADVICE (OUTPATIENT)
Dept: EDUCATION SERVICES | Facility: CLINIC | Age: 31
End: 2021-06-03

## 2021-06-03 RX ORDER — INSULIN HUMAN 100 [IU]/ML
43 INJECTION, SUSPENSION SUBCUTANEOUS AT BEDTIME
Qty: 15 ML | Refills: 3 | Status: SHIPPED | OUTPATIENT
Start: 2021-06-03 | End: 2021-06-16

## 2021-06-03 NOTE — TELEPHONE ENCOUNTER
Gestational Diabetes Follow-up    Subjective/Objective:    Stacey BARAKAT Zackben sent in blood glucose log for review. Last date of communication was: 5/25/21.    Gestational diabetes is being managed with diet, activity and medications    Taking diabetes medications:   yes:     Diabetes Medication(s)     Insulin       insulin NPH (HUMULIN N KWIKPEN) 100 UNIT/ML injection    Inject 39 Units Subcutaneous At Bedtime          Estimated Date of Delivery: Data Unavailable    BG/Food Log:         Assessment:  Blood sugars above target fasting due to missed insulin.  Prior to forgetting insulin patient was still seeing some elevated fasting readings at increased dose.  Recommend insulin dose increase.     Ketones: trace.   Fasting blood glucoses: 10% in target.  After breakfast: 100% in target.  After lunch: 89% in target.  After dinner: 100% in target.    Plan/Response:  Recommend increase to insulin - NPH 0-0-0-39 --> 0-0-0-43.  Follow up Monday if elevated OR 1 week if in target.    Liset Fabian MS, RD, LD, CDE      Any diabetes medication dose changes were made via the CDE Protocol and Collaborative Practice Agreement with the patient's OB/GYN provider. A copy of this encounter was shared with the provider.

## 2021-06-16 ENCOUNTER — MYC MEDICAL ADVICE (OUTPATIENT)
Dept: EDUCATION SERVICES | Facility: CLINIC | Age: 31
End: 2021-06-16

## 2021-06-16 RX ORDER — INSULIN HUMAN 100 [IU]/ML
50 INJECTION, SUSPENSION SUBCUTANEOUS AT BEDTIME
Qty: 15 ML | Refills: 3 | Status: ON HOLD | OUTPATIENT
Start: 2021-06-16 | End: 2021-07-02

## 2021-06-16 NOTE — LETTER
6/16/2021         RE: Stacey Bhandari  [address]  [facility] [address]        Dear Colleague,    Thank you for referring your patient, Stacey Bhandari, to the [facility]. Please see a copy of my visit note below.      Gestational Diabetes Follow-up    Subjective/Objective:    Stacey Bhandari sent in blood glucose log for review. Last date of communication was: 6/3.    Gestational diabetes is being managed with medications    Taking diabetes medications:   yes:     Diabetes Medication(s)     Insulin       insulin NPH (HUMULIN N KWIKPEN) 100 UNIT/ML injection    Inject 43 Units Subcutaneous At Bedtime          Estimated Date of Delivery: 7/14    BG/Food Log:         Assessment:    Ketones: not recorded.   Fasting blood glucoses: 21% in target.  After breakfast: 100% in target.  Before lunch: -% in target.  After cuaxa632% in target.  Before dinner: -% in target.  After dinner: 100% in target.    FBS remains mostly elevated and this past week her fasting is 0% in target. Therefore, will do a closer to 20% increase today.     Plan/Response:  Recommend increase to insulin - increase NPH 0-0-0-43 --> 0-0-0-50   Follow up Friday.     MART Marroquin CDE

## 2021-06-16 NOTE — TELEPHONE ENCOUNTER
Gestational Diabetes Follow-up    Subjective/Objective:    Stacey Bhandari sent in blood glucose log for review. Last date of communication was: 6/3.    Gestational diabetes is being managed with medications    Taking diabetes medications:   yes:     Diabetes Medication(s)     Insulin       insulin NPH (HUMULIN N KWIKPEN) 100 UNIT/ML injection    Inject 43 Units Subcutaneous At Bedtime          Estimated Date of Delivery: 7/14    BG/Food Log:         Assessment:    Ketones: not recorded.   Fasting blood glucoses: 21% in target.  After breakfast: 100% in target.  Before lunch: -% in target.  After poclx357% in target.  Before dinner: -% in target.  After dinner: 100% in target.    FBS remains mostly elevated and this past week her fasting is 0% in target. Therefore, will do a closer to 20% increase today.     Plan/Response:  Recommend increase to insulin - increase NPH 0-0-0-43 --> 0-0-0-50   Follow up Friday.   Sent communication to pt's OB with insulin change.     MART Marroquin CDE      Any diabetes medication dose changes were made via the CDE Protocol and Collaborative Practice Agreement with the patient's OB/GYN provider. A copy of this encounter was shared with the provider.

## 2021-06-23 ENCOUNTER — TRANSFERRED RECORDS (OUTPATIENT)
Dept: HEALTH INFORMATION MANAGEMENT | Facility: CLINIC | Age: 31
End: 2021-06-23

## 2021-06-23 LAB — GROUP B STREP PCR: POSITIVE

## 2021-06-24 DIAGNOSIS — O24.419 GESTATIONAL DIABETES: Primary | ICD-10-CM

## 2021-06-27 DIAGNOSIS — O24.419 GESTATIONAL DIABETES: ICD-10-CM

## 2021-06-27 LAB
LABORATORY COMMENT REPORT: NORMAL
SARS-COV-2 RNA RESP QL NAA+PROBE: NEGATIVE
SARS-COV-2 RNA RESP QL NAA+PROBE: NORMAL
SPECIMEN SOURCE: NORMAL
SPECIMEN SOURCE: NORMAL

## 2021-06-27 PROCEDURE — U0003 INFECTIOUS AGENT DETECTION BY NUCLEIC ACID (DNA OR RNA); SEVERE ACUTE RESPIRATORY SYNDROME CORONAVIRUS 2 (SARS-COV-2) (CORONAVIRUS DISEASE [COVID-19]), AMPLIFIED PROBE TECHNIQUE, MAKING USE OF HIGH THROUGHPUT TECHNOLOGIES AS DESCRIBED BY CMS-2020-01-R: HCPCS | Performed by: OBSTETRICS & GYNECOLOGY

## 2021-06-27 PROCEDURE — U0005 INFEC AGEN DETEC AMPLI PROBE: HCPCS | Performed by: OBSTETRICS & GYNECOLOGY

## 2021-06-30 ENCOUNTER — HOSPITAL ENCOUNTER (INPATIENT)
Facility: CLINIC | Age: 31
LOS: 2 days | Discharge: HOME | End: 2021-07-02
Attending: OBSTETRICS & GYNECOLOGY | Admitting: OBSTETRICS & GYNECOLOGY
Payer: COMMERCIAL

## 2021-06-30 LAB
ABO + RH BLD: NORMAL
ABO + RH BLD: NORMAL
BASOPHILS # BLD AUTO: 0 10E9/L (ref 0–0.2)
BASOPHILS NFR BLD AUTO: 0.3 %
BLD GP AB SCN SERPL QL: NORMAL
BLOOD BANK CMNT PATIENT-IMP: NORMAL
DIFFERENTIAL METHOD BLD: ABNORMAL
EOSINOPHIL # BLD AUTO: 0.1 10E9/L (ref 0–0.7)
EOSINOPHIL NFR BLD AUTO: 1.2 %
ERYTHROCYTE [DISTWIDTH] IN BLOOD BY AUTOMATED COUNT: 13.6 % (ref 10–15)
GLUCOSE BLDC GLUCOMTR-MCNC: 84 MG/DL (ref 70–99)
HCT VFR BLD AUTO: 34.1 % (ref 35–47)
HGB BLD-MCNC: 11.3 G/DL (ref 11.7–15.7)
IMM GRANULOCYTES # BLD: 0.2 10E9/L (ref 0–0.4)
IMM GRANULOCYTES NFR BLD: 2 %
LYMPHOCYTES # BLD AUTO: 2.1 10E9/L (ref 0.8–5.3)
LYMPHOCYTES NFR BLD AUTO: 20 %
MCH RBC QN AUTO: 27.3 PG (ref 26.5–33)
MCHC RBC AUTO-ENTMCNC: 33.1 G/DL (ref 31.5–36.5)
MCV RBC AUTO: 82 FL (ref 78–100)
MONOCYTES # BLD AUTO: 1 10E9/L (ref 0–1.3)
MONOCYTES NFR BLD AUTO: 9.6 %
NEUTROPHILS # BLD AUTO: 7 10E9/L (ref 1.6–8.3)
NEUTROPHILS NFR BLD AUTO: 66.9 %
NRBC # BLD AUTO: 0 10*3/UL
NRBC BLD AUTO-RTO: 0 /100
PLATELET # BLD AUTO: 223 10E9/L (ref 150–450)
RBC # BLD AUTO: 4.14 10E12/L (ref 3.8–5.2)
SPECIMEN EXP DATE BLD: NORMAL
WBC # BLD AUTO: 10.5 10E9/L (ref 4–11)

## 2021-06-30 PROCEDURE — 120N000001 HC R&B MED SURG/OB

## 2021-06-30 PROCEDURE — 250N000013 HC RX MED GY IP 250 OP 250 PS 637: Performed by: OBSTETRICS & GYNECOLOGY

## 2021-06-30 PROCEDURE — 86900 BLOOD TYPING SEROLOGIC ABO: CPT | Performed by: OBSTETRICS & GYNECOLOGY

## 2021-06-30 PROCEDURE — 86850 RBC ANTIBODY SCREEN: CPT | Performed by: OBSTETRICS & GYNECOLOGY

## 2021-06-30 PROCEDURE — 999N001017 HC STATISTIC GLUCOSE BY METER IP

## 2021-06-30 PROCEDURE — 86901 BLOOD TYPING SEROLOGIC RH(D): CPT | Performed by: OBSTETRICS & GYNECOLOGY

## 2021-06-30 PROCEDURE — 250N000012 HC RX MED GY IP 250 OP 636 PS 637: Performed by: OBSTETRICS & GYNECOLOGY

## 2021-06-30 PROCEDURE — 3E0P7GC INTRODUCTION OF OTHER THERAPEUTIC SUBSTANCE INTO FEMALE REPRODUCTIVE, VIA NATURAL OR ARTIFICIAL OPENING: ICD-10-PCS | Performed by: OBSTETRICS & GYNECOLOGY

## 2021-06-30 PROCEDURE — 85025 COMPLETE CBC W/AUTO DIFF WBC: CPT | Performed by: OBSTETRICS & GYNECOLOGY

## 2021-06-30 PROCEDURE — 86780 TREPONEMA PALLIDUM: CPT | Performed by: OBSTETRICS & GYNECOLOGY

## 2021-06-30 RX ORDER — LIDOCAINE 40 MG/G
CREAM TOPICAL
Status: DISCONTINUED | OUTPATIENT
Start: 2021-06-30 | End: 2021-07-01

## 2021-06-30 RX ORDER — IBUPROFEN 400 MG/1
800 TABLET, FILM COATED ORAL
Status: DISCONTINUED | OUTPATIENT
Start: 2021-06-30 | End: 2021-07-01

## 2021-06-30 RX ORDER — VITAMIN A ACETATE, .BETA.-CAROTENE, ASCORBIC ACID, CHOLECALCIFEROL, .ALPHA.-TOCOPHEROL ACETATE, DL-, THIAMINE MONONITRATE, RIBOFLAVIN, NIACINAMIDE, PYRIDOXINE HYDROCHLORIDE, FOLIC ACID, CYANOCOBALAMIN, CALCIUM CARBONATE, FERROUS FUMARATE, ZINC OXIDE, AND CUPRIC OXIDE 2000; 2000; 120; 400; 22; 1.84; 3; 20; 10; 1; 12; 200; 27; 25; 2 [IU]/1; [IU]/1; MG/1; [IU]/1; MG/1; MG/1; MG/1; MG/1; MG/1; MG/1; UG/1; MG/1; MG/1; MG/1; MG/1
1 TABLET ORAL DAILY
COMMUNITY

## 2021-06-30 RX ORDER — ZOLPIDEM TARTRATE 5 MG/1
5 TABLET ORAL
Status: DISCONTINUED | OUTPATIENT
Start: 2021-06-30 | End: 2021-07-02 | Stop reason: HOSPADM

## 2021-06-30 RX ORDER — FENTANYL CITRATE 50 UG/ML
50-100 INJECTION, SOLUTION INTRAMUSCULAR; INTRAVENOUS
Status: DISCONTINUED | OUTPATIENT
Start: 2021-06-30 | End: 2021-07-01

## 2021-06-30 RX ORDER — SODIUM CHLORIDE, SODIUM LACTATE, POTASSIUM CHLORIDE, CALCIUM CHLORIDE 600; 310; 30; 20 MG/100ML; MG/100ML; MG/100ML; MG/100ML
INJECTION, SOLUTION INTRAVENOUS CONTINUOUS
Status: DISCONTINUED | OUTPATIENT
Start: 2021-06-30 | End: 2021-07-01

## 2021-06-30 RX ORDER — OXYTOCIN/0.9 % SODIUM CHLORIDE 30/500 ML
100-340 PLASTIC BAG, INJECTION (ML) INTRAVENOUS CONTINUOUS PRN
Status: COMPLETED | OUTPATIENT
Start: 2021-06-30 | End: 2021-07-01

## 2021-06-30 RX ORDER — TERBUTALINE SULFATE 1 MG/ML
0.25 INJECTION, SOLUTION SUBCUTANEOUS
Status: DISCONTINUED | OUTPATIENT
Start: 2021-06-30 | End: 2021-07-01

## 2021-06-30 RX ORDER — NALOXONE HYDROCHLORIDE 0.4 MG/ML
0.4 INJECTION, SOLUTION INTRAMUSCULAR; INTRAVENOUS; SUBCUTANEOUS
Status: DISCONTINUED | OUTPATIENT
Start: 2021-06-30 | End: 2021-07-01

## 2021-06-30 RX ORDER — MISOPROSTOL 100 UG/1
25 TABLET ORAL EVERY 4 HOURS PRN
Status: DISCONTINUED | OUTPATIENT
Start: 2021-06-30 | End: 2021-07-01

## 2021-06-30 RX ORDER — OXYCODONE AND ACETAMINOPHEN 5; 325 MG/1; MG/1
1 TABLET ORAL
Status: DISCONTINUED | OUTPATIENT
Start: 2021-06-30 | End: 2021-07-01

## 2021-06-30 RX ORDER — PENICILLIN G POTASSIUM 5000000 [IU]/1
5 INJECTION, POWDER, FOR SOLUTION INTRAMUSCULAR; INTRAVENOUS ONCE
Status: COMPLETED | OUTPATIENT
Start: 2021-06-30 | End: 2021-07-01

## 2021-06-30 RX ORDER — DEXTROSE MONOHYDRATE 25 G/50ML
25-50 INJECTION, SOLUTION INTRAVENOUS
Status: DISCONTINUED | OUTPATIENT
Start: 2021-06-30 | End: 2021-07-02 | Stop reason: HOSPADM

## 2021-06-30 RX ORDER — TRANEXAMIC ACID 10 MG/ML
1 INJECTION, SOLUTION INTRAVENOUS EVERY 30 MIN PRN
Status: DISCONTINUED | OUTPATIENT
Start: 2021-06-30 | End: 2021-07-01

## 2021-06-30 RX ORDER — NALOXONE HYDROCHLORIDE 0.4 MG/ML
0.2 INJECTION, SOLUTION INTRAMUSCULAR; INTRAVENOUS; SUBCUTANEOUS
Status: DISCONTINUED | OUTPATIENT
Start: 2021-06-30 | End: 2021-07-01

## 2021-06-30 RX ORDER — ONDANSETRON 2 MG/ML
4 INJECTION INTRAMUSCULAR; INTRAVENOUS EVERY 6 HOURS PRN
Status: DISCONTINUED | OUTPATIENT
Start: 2021-06-30 | End: 2021-07-01

## 2021-06-30 RX ORDER — METHYLERGONOVINE MALEATE 0.2 MG/ML
200 INJECTION INTRAVENOUS
Status: DISCONTINUED | OUTPATIENT
Start: 2021-06-30 | End: 2021-07-01

## 2021-06-30 RX ORDER — OXYTOCIN 10 [USP'U]/ML
10 INJECTION, SOLUTION INTRAMUSCULAR; INTRAVENOUS
Status: DISCONTINUED | OUTPATIENT
Start: 2021-06-30 | End: 2021-07-01

## 2021-06-30 RX ORDER — CARBOPROST TROMETHAMINE 250 UG/ML
250 INJECTION, SOLUTION INTRAMUSCULAR
Status: DISCONTINUED | OUTPATIENT
Start: 2021-06-30 | End: 2021-07-01

## 2021-06-30 RX ORDER — NICOTINE POLACRILEX 4 MG
15-30 LOZENGE BUCCAL
Status: DISCONTINUED | OUTPATIENT
Start: 2021-06-30 | End: 2021-07-02 | Stop reason: HOSPADM

## 2021-06-30 RX ORDER — ACETAMINOPHEN 325 MG/1
650 TABLET ORAL EVERY 4 HOURS PRN
Status: DISCONTINUED | OUTPATIENT
Start: 2021-06-30 | End: 2021-07-01

## 2021-06-30 RX ADMIN — ZOLPIDEM TARTRATE 5 MG: 5 TABLET ORAL at 22:34

## 2021-06-30 RX ADMIN — MISOPROSTOL 25 MCG: 100 TABLET ORAL at 20:53

## 2021-06-30 RX ADMIN — INSULIN HUMAN 50 UNITS: 100 INJECTION, SUSPENSION SUBCUTANEOUS at 22:30

## 2021-06-30 ASSESSMENT — MIFFLIN-ST. JEOR: SCORE: 1813.58

## 2021-07-01 ENCOUNTER — ANESTHESIA (OUTPATIENT)
Dept: OBGYN | Facility: CLINIC | Age: 31
End: 2021-07-01
Payer: COMMERCIAL

## 2021-07-01 ENCOUNTER — ANESTHESIA EVENT (OUTPATIENT)
Dept: OBGYN | Facility: CLINIC | Age: 31
End: 2021-07-01
Payer: COMMERCIAL

## 2021-07-01 LAB
GLUCOSE BLDC GLUCOMTR-MCNC: 80 MG/DL (ref 70–99)
GLUCOSE BLDC GLUCOMTR-MCNC: 84 MG/DL (ref 70–99)
GLUCOSE BLDC GLUCOMTR-MCNC: 84 MG/DL (ref 70–99)
GLUCOSE BLDC GLUCOMTR-MCNC: 94 MG/DL (ref 70–99)
GLUCOSE BLDC GLUCOMTR-MCNC: 98 MG/DL (ref 70–99)
T PALLIDUM AB SER QL: NONREACTIVE

## 2021-07-01 PROCEDURE — 258N000003 HC RX IP 258 OP 636: Performed by: OBSTETRICS & GYNECOLOGY

## 2021-07-01 PROCEDURE — 250N000011 HC RX IP 250 OP 636: Performed by: ANESTHESIOLOGY

## 2021-07-01 PROCEDURE — 99207 PR NO CHARGE LOS: CPT | Performed by: PHYSICIAN ASSISTANT

## 2021-07-01 PROCEDURE — 3E0R3BZ INTRODUCTION OF ANESTHETIC AGENT INTO SPINAL CANAL, PERCUTANEOUS APPROACH: ICD-10-PCS | Performed by: ANESTHESIOLOGY

## 2021-07-01 PROCEDURE — 370N000003 HC ANESTHESIA WARD SERVICE

## 2021-07-01 PROCEDURE — 250N000013 HC RX MED GY IP 250 OP 250 PS 637: Performed by: OBSTETRICS & GYNECOLOGY

## 2021-07-01 PROCEDURE — 00HU33Z INSERTION OF INFUSION DEVICE INTO SPINAL CANAL, PERCUTANEOUS APPROACH: ICD-10-PCS | Performed by: ANESTHESIOLOGY

## 2021-07-01 PROCEDURE — 250N000009 HC RX 250: Performed by: OBSTETRICS & GYNECOLOGY

## 2021-07-01 PROCEDURE — 10907ZC DRAINAGE OF AMNIOTIC FLUID, THERAPEUTIC FROM PRODUCTS OF CONCEPTION, VIA NATURAL OR ARTIFICIAL OPENING: ICD-10-PCS | Performed by: OBSTETRICS & GYNECOLOGY

## 2021-07-01 PROCEDURE — 999N001017 HC STATISTIC GLUCOSE BY METER IP

## 2021-07-01 PROCEDURE — 250N000009 HC RX 250: Performed by: ANESTHESIOLOGY

## 2021-07-01 PROCEDURE — 120N000012 HC R&B POSTPARTUM

## 2021-07-01 PROCEDURE — 250N000011 HC RX IP 250 OP 636: Performed by: OBSTETRICS & GYNECOLOGY

## 2021-07-01 PROCEDURE — 722N000001 HC LABOR CARE VAGINAL DELIVERY SINGLE

## 2021-07-01 RX ORDER — HYDROCORTISONE 2.5 %
CREAM (GRAM) TOPICAL 3 TIMES DAILY PRN
Status: DISCONTINUED | OUTPATIENT
Start: 2021-07-01 | End: 2021-07-02 | Stop reason: HOSPADM

## 2021-07-01 RX ORDER — TRANEXAMIC ACID 10 MG/ML
1 INJECTION, SOLUTION INTRAVENOUS EVERY 30 MIN PRN
Status: DISCONTINUED | OUTPATIENT
Start: 2021-07-01 | End: 2021-07-02 | Stop reason: HOSPADM

## 2021-07-01 RX ORDER — AMOXICILLIN 250 MG
2 CAPSULE ORAL 2 TIMES DAILY
Status: DISCONTINUED | OUTPATIENT
Start: 2021-07-01 | End: 2021-07-02 | Stop reason: HOSPADM

## 2021-07-01 RX ORDER — NALBUPHINE HYDROCHLORIDE 10 MG/ML
2.5-5 INJECTION, SOLUTION INTRAMUSCULAR; INTRAVENOUS; SUBCUTANEOUS EVERY 6 HOURS PRN
Status: DISCONTINUED | OUTPATIENT
Start: 2021-07-01 | End: 2021-07-01

## 2021-07-01 RX ORDER — ROPIVACAINE HYDROCHLORIDE 2 MG/ML
10 INJECTION, SOLUTION EPIDURAL; INFILTRATION; PERINEURAL ONCE
Status: DISCONTINUED | OUTPATIENT
Start: 2021-07-01 | End: 2021-07-01

## 2021-07-01 RX ORDER — NICOTINE POLACRILEX 4 MG
15-30 LOZENGE BUCCAL
Status: DISCONTINUED | OUTPATIENT
Start: 2021-07-01 | End: 2021-07-02 | Stop reason: HOSPADM

## 2021-07-01 RX ORDER — BISACODYL 10 MG
10 SUPPOSITORY, RECTAL RECTAL DAILY PRN
Status: DISCONTINUED | OUTPATIENT
Start: 2021-07-03 | End: 2021-07-02 | Stop reason: HOSPADM

## 2021-07-01 RX ORDER — METHYLERGONOVINE MALEATE 0.2 MG/ML
200 INJECTION INTRAVENOUS
Status: DISCONTINUED | OUTPATIENT
Start: 2021-07-01 | End: 2021-07-02 | Stop reason: HOSPADM

## 2021-07-01 RX ORDER — ONDANSETRON 4 MG/1
4 TABLET, ORALLY DISINTEGRATING ORAL EVERY 6 HOURS PRN
Status: DISCONTINUED | OUTPATIENT
Start: 2021-07-01 | End: 2021-07-01

## 2021-07-01 RX ORDER — OXYTOCIN/0.9 % SODIUM CHLORIDE 30/500 ML
100 PLASTIC BAG, INJECTION (ML) INTRAVENOUS CONTINUOUS
Status: DISCONTINUED | OUTPATIENT
Start: 2021-07-01 | End: 2021-07-02 | Stop reason: HOSPADM

## 2021-07-01 RX ORDER — FENTANYL CITRATE 50 UG/ML
100 INJECTION, SOLUTION INTRAMUSCULAR; INTRAVENOUS ONCE
Status: DISCONTINUED | OUTPATIENT
Start: 2021-07-01 | End: 2021-07-01

## 2021-07-01 RX ORDER — DEXTROSE MONOHYDRATE 25 G/50ML
25-50 INJECTION, SOLUTION INTRAVENOUS
Status: DISCONTINUED | OUTPATIENT
Start: 2021-07-01 | End: 2021-07-01

## 2021-07-01 RX ORDER — SODIUM CHLORIDE 9 MG/ML
INJECTION, SOLUTION INTRAVENOUS CONTINUOUS
Status: DISCONTINUED | OUTPATIENT
Start: 2021-07-01 | End: 2021-07-01

## 2021-07-01 RX ORDER — ACETAMINOPHEN 325 MG/1
650 TABLET ORAL EVERY 4 HOURS PRN
Status: DISCONTINUED | OUTPATIENT
Start: 2021-07-01 | End: 2021-07-02 | Stop reason: HOSPADM

## 2021-07-01 RX ORDER — OXYTOCIN/0.9 % SODIUM CHLORIDE 30/500 ML
340 PLASTIC BAG, INJECTION (ML) INTRAVENOUS CONTINUOUS PRN
Status: DISCONTINUED | OUTPATIENT
Start: 2021-07-01 | End: 2021-07-02 | Stop reason: HOSPADM

## 2021-07-01 RX ORDER — EPHEDRINE SULFATE 50 MG/ML
5 INJECTION, SOLUTION INTRAMUSCULAR; INTRAVENOUS; SUBCUTANEOUS
Status: DISCONTINUED | OUTPATIENT
Start: 2021-07-01 | End: 2021-07-01

## 2021-07-01 RX ORDER — DEXTROSE MONOHYDRATE 25 G/50ML
25-50 INJECTION, SOLUTION INTRAVENOUS
Status: DISCONTINUED | OUTPATIENT
Start: 2021-07-01 | End: 2021-07-02 | Stop reason: HOSPADM

## 2021-07-01 RX ORDER — AMOXICILLIN 250 MG
1 CAPSULE ORAL 2 TIMES DAILY
Status: DISCONTINUED | OUTPATIENT
Start: 2021-07-01 | End: 2021-07-02 | Stop reason: HOSPADM

## 2021-07-01 RX ORDER — ONDANSETRON 2 MG/ML
4 INJECTION INTRAMUSCULAR; INTRAVENOUS EVERY 6 HOURS PRN
Status: DISCONTINUED | OUTPATIENT
Start: 2021-07-01 | End: 2021-07-01

## 2021-07-01 RX ORDER — FENTANYL CITRATE 50 UG/ML
INJECTION, SOLUTION INTRAMUSCULAR; INTRAVENOUS
Status: COMPLETED | OUTPATIENT
Start: 2021-07-01 | End: 2021-07-01

## 2021-07-01 RX ORDER — NICOTINE POLACRILEX 4 MG
15-30 LOZENGE BUCCAL
Status: DISCONTINUED | OUTPATIENT
Start: 2021-07-01 | End: 2021-07-01

## 2021-07-01 RX ORDER — OXYTOCIN 10 [USP'U]/ML
10 INJECTION, SOLUTION INTRAMUSCULAR; INTRAVENOUS
Status: DISCONTINUED | OUTPATIENT
Start: 2021-07-01 | End: 2021-07-02 | Stop reason: HOSPADM

## 2021-07-01 RX ORDER — LIDOCAINE HYDROCHLORIDE AND EPINEPHRINE 15; 5 MG/ML; UG/ML
INJECTION, SOLUTION EPIDURAL PRN
Status: DISCONTINUED | OUTPATIENT
Start: 2021-07-01 | End: 2021-07-01

## 2021-07-01 RX ORDER — IBUPROFEN 400 MG/1
800 TABLET, FILM COATED ORAL EVERY 6 HOURS PRN
Status: DISCONTINUED | OUTPATIENT
Start: 2021-07-01 | End: 2021-07-02 | Stop reason: HOSPADM

## 2021-07-01 RX ORDER — ROPIVACAINE HYDROCHLORIDE 2 MG/ML
INJECTION, SOLUTION EPIDURAL; INFILTRATION; PERINEURAL
Status: COMPLETED | OUTPATIENT
Start: 2021-07-01 | End: 2021-07-01

## 2021-07-01 RX ORDER — MISOPROSTOL 200 UG/1
800 TABLET ORAL
Status: DISCONTINUED | OUTPATIENT
Start: 2021-07-01 | End: 2021-07-02 | Stop reason: HOSPADM

## 2021-07-01 RX ORDER — MODIFIED LANOLIN
OINTMENT (GRAM) TOPICAL
Status: DISCONTINUED | OUTPATIENT
Start: 2021-07-01 | End: 2021-07-02 | Stop reason: HOSPADM

## 2021-07-01 RX ADMIN — Medication 340 ML/HR: at 13:17

## 2021-07-01 RX ADMIN — Medication 12 ML/HR: at 10:29

## 2021-07-01 RX ADMIN — ACETAMINOPHEN 650 MG: 325 TABLET, FILM COATED ORAL at 15:56

## 2021-07-01 RX ADMIN — ROPIVACAINE HYDROCHLORIDE 10 ML: 2 INJECTION, SOLUTION EPIDURAL; INFILTRATION at 10:27

## 2021-07-01 RX ADMIN — SODIUM CHLORIDE 2.5 MILLION UNITS: 9 INJECTION, SOLUTION INTRAVENOUS at 11:59

## 2021-07-01 RX ADMIN — SODIUM CHLORIDE, POTASSIUM CHLORIDE, SODIUM LACTATE AND CALCIUM CHLORIDE: 600; 310; 30; 20 INJECTION, SOLUTION INTRAVENOUS at 07:50

## 2021-07-01 RX ADMIN — LIDOCAINE HYDROCHLORIDE AND EPINEPHRINE 3 ML: 15; 5 INJECTION, SOLUTION EPIDURAL at 10:24

## 2021-07-01 RX ADMIN — SENNOSIDES AND DOCUSATE SODIUM 1 TABLET: 8.6; 5 TABLET ORAL at 20:35

## 2021-07-01 RX ADMIN — ACETAMINOPHEN 650 MG: 325 TABLET, FILM COATED ORAL at 20:35

## 2021-07-01 RX ADMIN — FENTANYL CITRATE 100 MCG: 50 INJECTION, SOLUTION INTRAMUSCULAR; INTRAVENOUS at 10:27

## 2021-07-01 RX ADMIN — MISOPROSTOL 25 MCG: 100 TABLET ORAL at 04:57

## 2021-07-01 RX ADMIN — MISOPROSTOL 25 MCG: 100 TABLET ORAL at 00:54

## 2021-07-01 RX ADMIN — PENICILLIN G POTASSIUM 5 MILLION UNITS: 5000000 POWDER, FOR SOLUTION INTRAMUSCULAR; INTRAPLEURAL; INTRATHECAL; INTRAVENOUS at 07:50

## 2021-07-01 NOTE — PLAN OF CARE
AROM by Dr Campbell at 0735, small amount of clear fluid expressed. SVE per MD 3/60/-1. Penicillin started at 0750.

## 2021-07-01 NOTE — PLAN OF CARE
Delivery of viable female at 1312. Placenta delivered at 1316. Pit started at 340ml/hr. Epidural stopped.

## 2021-07-01 NOTE — PLAN OF CARE
Discussed monitoring options with patient, including Ama.  Pt states she has skin sensitivity to everything that touches her skin - she already is getting red raised area due to monitor belt.  Pt states she would like to try the Ama monitor.  Pt instructed to report to RN if she has any itching, burning, redness or discomfort from patches.  Pt verbalizes understanding.

## 2021-07-01 NOTE — H&P
"[facility]   LABOR ADMIT    Stacey Serna MRN# [id number]  Age: 31 year old YOB: 1990    Date of Admission: 2021    Primary care provider: Mariana Reynolds     HPI: This is a 31 year old  at 37w1d admitted for IOL due to poorly controlled A2GDM on insulin. She declined genetic screening. She did not pass an early 1 hr GCT but passed the 3 hr follow-up at that time. She did not pass the 3 hr GCT at 28 weeks. She has required increasing dosing of at bedtime insulin, now up to 50 U. EFW at 36 weeks 7#2 oz (87%ile).    Obstetrical History:  G1 A2GDM insulin-controlled IOL at 37 weeks due to poor control, should dystocia encountered,     Past Medical History:  This patient has no significant past medical history     Past Surgical History:  This patient has no significant past surgical history     Social History:  This patient has no significant social history     Family History:  This patient has no significant family history     Allergies:  N/v with narcotic meds (not a true allergy)    Physical Exam:  /75   Pulse 90   Temp 97.8  F (36.6  C) (Temporal)   Resp 16   Ht 1.651 m (5' 5\")   Wt 109.8 kg (242 lb)   BMI 40.27 kg/m    Gen: NAD  Abd: soft, NT, ND, gravid  Ext: NT, nonedematous  SVE: 3/60/-2 (significantly to patient right) AROM clear fluid    FHT: 150s/mod/+accels/no decels  [place]: intermittent    Prenatal Labs:   Lab Results   Component Value Date    ABO B 2021    RH Pos 2021    AS Neg 2021    HEPBANG negative 2020    RUBELLAABIGG immune 2020    HGB 11.3 (L) 2021       GBS Status:   Lab Results   Component Value Date    GBS positive 2021       Assessment:  This is a 31 year old  at 37w1d admitted to L&D for IOL due to poorly-controlled A2GDM.     Plan:  Admitted to L&D.  Received vaginal cytotec overnight  Amniotomy now for clear fluid  Will start pitocin when clinically appropriate if needed  GBS pos " - abx to start now  Cat I FHT  Anticipate   Will be prepared for recurrent SD      Therese Campbell MD  2021 0823

## 2021-07-01 NOTE — ANESTHESIA PROCEDURE NOTES
Epidural catheter Procedure Note  Pre-Procedure   Staff -        Anesthesiologist:  Beth Murcia MD       Performed By: anesthesiologist       Location: OB       Pre-Anesthestic Checklist: patient identified, IV checked, risks and benefits discussed, informed consent, monitors and equipment checked, pre-op evaluation and at physician/surgeon's request  Timeout:       Correct Patient: Yes        Correct Procedure: Yes        Correct Site: Yes        Correct Position: Yes   Procedure Documentation  Procedure: epidural catheter       Diagnosis: Labor Pain       Patient Position: sitting       Patient Prep/Sterile Barriers: sterile gloves, mask, patient draped, x3       Skin prep: Betadine      Local skin infiltrated with mL of 1% lidocaine.        Insertion Site: L3-4. (midline approach).       Technique: LORT saline        SHEILA at 5 cm.       Needle Type: Tractiony needle       Needle Gauge: 17.        Needle Length (Inches): 5        Catheter: 19 G.         Catheter threaded easily.         3 cm epidural space.         Threaded 9 cm at skin.         # of attempts: 2 and  # of redirects:  2    Assessment/Narrative         Paresthesias: No.      Test dose of 3 mL lidocaine 1.5% w/ 1:200,000 epinephrine at.         Test dose negative, 3 minutes after injection, for signs of intravascular, subdural, or intrathecal injection.       Insertion/Infusion Method: LORT saline       Aspiration negative for Heme or CSF via Epidural Catheter.    Medication(s) Administered   0.2% Ropivacaine (Epidural), 10 mL  Fentanyl PF (Epidural), 100 mcg  Medication Administration Time: 7/1/2021 10:27 AM    Comments:  Catheter secured with adhesive spray, tegaderm, and tape.

## 2021-07-01 NOTE — PLAN OF CARE
Patient transferred from labor and delivery at 1600. Report given to Loren MORALEZ RN. Safety and security, and education reviewed. Baby bands checked.

## 2021-07-01 NOTE — PLAN OF CARE
Pt arrives ambulatory to 220 for cervical ripening.  Pt has gestational diabetes, states her blood sugars had been high in the mornings.  She states she has felt some tightening she says are srikanth sheriff-type contractions.  External monitors applied, assessment and admission completed.   Kristian present and supportive.

## 2021-07-01 NOTE — CONSULTS
Hospitalst Chart Check: GDM  This is a 31 year old  female with history of GDM who was admitted 21 at 37 weeks for cervical ripening in the setting of gestational diabetes.      Hospitalist following for postpartum glucose management of GDM. Will defer intrapartum diabetes management to OB.     Plan as below following delivery.     Per GDM protocol:  - Check blood glucose: Within 2 hours post-delivery, then before meals and at HS.   - Discontinue blood glucose monitoring if BG is within 70-99 mg/dL for 24 hours.    - If BG is not with 70-99 mg/dL notify provider for additional follow-up.  - With history of GDM do not anticipate she will have any insulin needs post delivery and can follow up with PCP post discharge for follow up testing as needed.     Recent Labs   Lab 21  0743 21   BGM 94 84     Will peripherally follow.     Tanya Stubbs PA-C  Pager [phone]

## 2021-07-01 NOTE — L&D DELIVERY NOTE
OB Vaginal Delivery Summary    Stacey Serna   Admission date: 2021  Delivery date:  21  Place of delivery: [facility]    The patient is a 31 year old  at 37w1d  wks gestation admitted to labor and delivery for IOL due to uncontrolled GDM on insulin.  The estimate fetal weight is 8.5#.     GBS was positive and she received adequate antibiotic prophylaxis.     Cervical ripening was achieved with cytotec. Membranes artificially ruptured and the fluid was clear. She did not require pitocin. For pain management she had epidural with good relief. During labor the fetal heart tones were Category II due to intermittent variable decelerations after epidural placement.    I was called over due to deep variable decelerations with eugenio in 60s. I presented to bedside and cervix was 9 cm dilated. I remained on the unit due to the FHT. There was good recovery between decelerations to normal baseline with moderate variability. A deceleration occurred with eugenio 50s. I presented to bedside again with the nurses caring for the patient. Cervix was completely dilated with fetus at 0 station. She began pushing with excellent efforts. Recurrent deep variable decelerations continued. We discussed the potential need for PLTCS vs VAVD depending on fetal station and FHT going forward, and a Kiwi vacuum was available in case of need. We briefly discussed the risks associated with VAVD. The NICU team was in the room. Ultimately she delivered a viable female infant with Apgars of 8/9. A nuchal cord was identified and reduced at the perineum. Given her history of shoulder dystocia and the NRFHT leading up to delivery, she was prophylactically placed in [place]. The shoulder slowly delivered - no dystocia. The infant was immediately placed on the maternal abdomen. The cord was clamped and cut after one minute, and the infant was then brought to the warmer for evaluation by the NICU team.     The placenta  delivered spontaneously and intact.  She received pitocin upon placental delivery.  The estimated blood loss was 150 mL.    The cervix and vagina were inspected.  There was a left periurethral laceration which was hemostatic without need for repair.    Mother and baby did well and went to normal postpartum and  nursery.      Therese Campbell MD  2021 6938

## 2021-07-01 NOTE — ANESTHESIA PREPROCEDURE EVALUATION
Anesthesia Pre-Procedure Evaluation    Patient: Stacey Serna   MRN: [id number] : 1990        Preoperative Diagnosis: * No surgery found *   Procedure :      Past Medical History:   Diagnosis Date     Gestational diabetes mellitus (GDM) in childbirth, insulin controlled 3/22/2019    both pregnancies      Past Surgical History:   Procedure Laterality Date     LAPAROSCOPIC CHOLECYSTECTOMY N/A 2019    Procedure: CHOLECYSTECTOMY, LAPAROSCOPIC;  Surgeon: Kannan Cuevas MD;  Location: [facility] OR     THYROIDECTOMY N/A 2016    Procedure: THYROIDECTOMY;  Surgeon: Sanjuana Vivas MD;  Location: [facility] OR     TONSILLECTOMY        Allergies   Allergen Reactions     Oxycodone Nausea and Vomiting     Vicodin [Hydrocodone-Acetaminophen] Nausea and Vomiting      Social History     Tobacco Use     Smoking status: Never Smoker     Smokeless tobacco: Never Used   Substance Use Topics     Alcohol use: Yes     Comment: occasional 1 drink/month      Wt Readings from Last 1 Encounters:   21 109.8 kg (242 lb)        Anesthesia Evaluation   Pt has had prior anesthetic.     No history of anesthetic complications       ROS/MED HX  ENT/Pulmonary:    (-) asthma   Neurologic:  - neg neurologic ROS     Cardiovascular:    (-) PIH   METS/Exercise Tolerance:     Hematologic:     (+) no anticoagulation therapy, no coagulopathy,     Musculoskeletal:       GI/Hepatic:     (+) GERD,     Renal/Genitourinary:       Endo:     (+) gestational diabetes,    Psychiatric/Substance Use:       Infectious Disease:       Malignancy:       Other:            Physical Exam    Airway        Mallampati: II   TM distance: > 3 FB   Neck ROM: full     Respiratory Devices and Support         Dental  no notable dental history         Cardiovascular   cardiovascular exam normal          Pulmonary   pulmonary exam normal                OUTSIDE LABS:  CBC:   Lab Results   Component Value Date    WBC 10.5 2021    WBC 19.7 (H) 2019    HGB 11.3  (L) 06/30/2021    HGB 12.3 03/23/2019    HCT 34.1 (L) 06/30/2021    HCT 37.3 03/23/2019     06/30/2021     03/23/2019     BMP:   Lab Results   Component Value Date    POTASSIUM 4.1 04/30/2019    CR 0.71 04/30/2019    CR 0.57 03/23/2019     (A) 04/30/2019     COAGS: No results found for: PTT, INR, FIBR  POC:   Lab Results   Component Value Date    BGM 94 07/01/2021    HCG Negative 05/07/2019     HEPATIC:   Lab Results   Component Value Date    ALT 31 04/30/2019    AST 32 04/30/2019     OTHER: No results found for: PH, LACT, A1C, TARA, PHOS, MAG, LIPASE, AMYLASE, TSH, T4, T3, CRP, SED    Anesthesia Plan    ASA Status:  2      Anesthesia Type: Epidural.              Consents    Anesthesia Plan(s) and associated risks, benefits, and realistic alternatives discussed. Questions answered and patient/representative(s) expressed understanding.     - Discussed with:  Patient         Postoperative Care            Comments:    Orders to manage the epidural infusion have been entered, and through coordination with the nurse, we will continute to manage and monitor the patient's labor epidural.  We will continuously be available to adjust as needed thruout the entire L&D process.             Beth Murcia MD

## 2021-07-01 NOTE — PLAN OF CARE
2 para 1 at 37 weeks gestation here for cervical ripening due to gestational diabetes mellitus.  External monitors applied, later Ama monitor applied after discussion of monitoring options with patient.  Patient states anything that touches her skin, to include things like grocery bags will cause raised red skin, risk discussed and patient wishes to have Ama.  Patches checked throughout the night - no unexpected redness noted, patient reports no burning, itching or sensitivity.  Vaginal cytotec used for ripening - no SVE change throughout the night, a total of 3 doses given from this RN.  Pt given ambien for sleep, patient was able to sleep well during most of the night.  GBS positive, antibiotics to be started when labor begins.   present and supportive.

## 2021-07-01 NOTE — PROGRESS NOTES
"[facility]  Obstetrics     Stacey Serna  1990  [id number]    S: Feeling more pressure.    O: /61   Pulse 90   Temp 97.7  F (36.5  C) (Temporal)   Resp 18   Ht 1.651 m (5' 5\")   Wt 109.8 kg (242 lb)   SpO2 91%   BMI 40.27 kg/m    Gen: NAD  Abd: soft, NT, ND, gravid  SVE: 9/90/0 - thicker cervix posteriorly     FHT: 145/mod/intermittent variable decelerations with eugenio 60s  [place]: ctx q2-3 mins    A/P: 30 yo  @ 37+1 admitted for IOL due to poorly controlled A2GDM  Progressing quickly in labor now. Due to deep variables, I will remain on the unit until delivery.     Therese Campbell MD  2021 1245  "

## 2021-07-02 VITALS
OXYGEN SATURATION: 96 % | HEIGHT: 65 IN | HEART RATE: 81 BPM | SYSTOLIC BLOOD PRESSURE: 139 MMHG | WEIGHT: 242 LBS | TEMPERATURE: 97.7 F | BODY MASS INDEX: 40.32 KG/M2 | DIASTOLIC BLOOD PRESSURE: 87 MMHG | RESPIRATION RATE: 18 BRPM

## 2021-07-02 LAB — GLUCOSE BLDC GLUCOMTR-MCNC: 107 MG/DL (ref 70–99)

## 2021-07-02 PROCEDURE — 250N000013 HC RX MED GY IP 250 OP 250 PS 637: Performed by: OBSTETRICS & GYNECOLOGY

## 2021-07-02 PROCEDURE — 999N001017 HC STATISTIC GLUCOSE BY METER IP

## 2021-07-02 RX ORDER — IBUPROFEN 800 MG/1
800 TABLET, FILM COATED ORAL EVERY 6 HOURS PRN
Qty: 60 TABLET | Refills: 0 | Status: SHIPPED | OUTPATIENT
Start: 2021-07-02

## 2021-07-02 RX ORDER — ACETAMINOPHEN 325 MG/1
650 TABLET ORAL EVERY 4 HOURS PRN
Qty: 100 TABLET | Refills: 0 | Status: SHIPPED | OUTPATIENT
Start: 2021-07-02

## 2021-07-02 RX ADMIN — ACETAMINOPHEN 650 MG: 325 TABLET, FILM COATED ORAL at 06:16

## 2021-07-02 RX ADMIN — ACETAMINOPHEN 650 MG: 325 TABLET, FILM COATED ORAL at 16:01

## 2021-07-02 RX ADMIN — ACETAMINOPHEN 650 MG: 325 TABLET, FILM COATED ORAL at 01:55

## 2021-07-02 RX ADMIN — ACETAMINOPHEN 650 MG: 325 TABLET, FILM COATED ORAL at 11:46

## 2021-07-02 NOTE — PROGRESS NOTES
"Stacey YUVAL Serna  2021   PPD1 s/p  after IOL for poorly controlled GDM on insulin    S: 31 year old  delivered at 37w1d   Doing well without complaints.  Sore but medication helping.   Lochia moderate.   Ambulating.  Urinating without issues.  Formula feeding.    O: /72   Pulse 92   Temp 98  F (36.7  C) (Oral)   Resp 18   Ht 1.651 m (5' 5\")   Wt 109.8 kg (242 lb)   SpO2 96%   Breastfeeding Unknown   BMI 40.27 kg/m    Gen: NAD  Abd: soft, NT, ND, FF below U  Ext: NT, nonedematous    A/P:  31 year old  PPD1 s/p  after IOL for poorly controlled GDM on insulin    GDM  - continue accuchecks - discussed with patient that are parameters are not nearly as strict once baby is out!  - plan 2 hr GCT in 6-12 weeks - discussed increased risk for T2DM    Postpartum  - ibuprofen and acetaminophen PRN pain  - support breastfeeding  - monitor lochia  - encourage ambulation  - regular diet  - routine PP care  - stable for discharge to home today    Therese Campbell MD  Text Page (8am - 5pm)    "

## 2021-07-02 NOTE — DISCHARGE INSTRUCTIONS
Please call the office if you experience  - bleeding through more than a pad per hour persistently  - passage of blood clots greater than the size of yonis  - abnormal vaginal discharge  - temperature greater than 100.4  - inability to tolerate food or fluid  - pain not controlled with oral medications  - persistent headache, vision changes, chest pain, shortness of breath, pain in the upper belly  - significant breast pain  - mood changes that affect your quality of life    Please make an appointment at [facility] in 6 weeks for a postpartum visit. We will do a test for diabetes at that time (2 hour glucose test)

## 2021-07-02 NOTE — DISCHARGE SUMMARY
[facility]    Discharge Summary  Obstetrics    Date of Admission:  2021  Date of Discharge:  2021  Discharging Provider: Therese Campbell    Discharge Diagnoses   A2GDM on insulin - poorly controlled  S/p     History of Present Illness   Stacey Serna is a 31 year old  who was admitted at 37+0 for IOL due to poorly controlled GDM on insulin. Cervical ripening was achieved with cytotec. Amniotomy was performed for induction. She progressed without need for pitocin. She ultimately delivered a viable female infant weighing 7#4 oz with Apgars of 8/9. She sustained a left periurethral laceration that was hemostatic without need for repair.    Hospital Course   The patient's hospital course was unremarkable.  She recovered as anticipated and experienced no post-delivery complications.  On discharge, her pain was well controlled. Vaginal bleeding appropriate.  Voiding without difficulty.  Ambulating well and tolerating a normal diet.  No fevers.  Formula feeding.  Infant stable.  She was discharged on post-partum day 1.    Post-partum hemoglobin:   Hemoglobin   Date Value Ref Range Status   2021 11.3 (L) 11.7 - 15.7 g/dL Final       Therese Campbell MD      Discharge Disposition   Discharged to home   Condition at discharge: Stable    Primary Care Physician   Mariana Reynolds    Consultations This Hospital Stay   HOSPITALIST IP CONSULT  ANESTHESIOLOGY IP CONSULT  HOME CARE POST PARTUM/ IP CONSULT  LACTATION IP CONSULT    Discharge Orders      Activity    Review discharge instructions     Reason for your hospital stay    Maternity care     Follow Up and recommended labs and tests    Please make a routine postpartum appointment at [facility] in 6 weeks     Discharge Instructions - Postpartum visit    Please make a routine postpartum appointment at [facility] in 6 weeks     Discharge Instructions - Gestational diabetic patients    Gestational diabetic  patients to follow up for fasting blood sugar and 2 hour 75gm glucose load at 6 weeks postpartum.     Diet    Resume previous diet     Discharge Medications   Current Discharge Medication List      START taking these medications    Details   acetaminophen (TYLENOL) 325 MG tablet Take 2 tablets (650 mg) by mouth every 4 hours as needed for mild pain  Qty: 100 tablet, Refills: 0    Associated Diagnoses: Single liveborn infant delivered vaginally      ibuprofen (ADVIL/MOTRIN) 800 MG tablet Take 1 tablet (800 mg) by mouth every 6 hours as needed for other (cramping)  Qty: 60 tablet, Refills: 0    Associated Diagnoses: Single liveborn infant delivered vaginally         CONTINUE these medications which have NOT CHANGED    Details   blood glucose (ONETOUCH VERIO IQ) test strip Use to test blood sugar 4 times daily or as directed.  Qty: 300 strip, Refills: 4    Associated Diagnoses: Gestational diabetes mellitus (GDM) in childbirth, insulin controlled      Blood Glucose Monitoring Suppl (ONETOUCH VERIO FLEX SYSTEM) w/Device KIT 1 each daily  Qty: 1 kit, Refills: 0    Associated Diagnoses: Gestational diabetes mellitus (GDM) in childbirth, insulin controlled      insulin pen needle (BD SAAD U/F) 32G X 4 MM miscellaneous Use 1 daily as directed.  Qty: 90 each, Refills: 3    Associated Diagnoses: Gestational diabetes mellitus (GDM) in childbirth, insulin controlled      OneTouch Delica Lancets 33G MISC 1 each 4 times daily  Qty: 100 each, Refills: 4    Associated Diagnoses: Gestational diabetes mellitus (GDM) in childbirth, insulin controlled      Prenatal Vit-Fe Fumarate-FA (PNV PRENATAL PLUS MULTIVITAMIN) 27-1 MG TABS per tablet Take 1 tablet by mouth daily         STOP taking these medications       insulin NPH (HUMULIN N KWIKPEN) 100 UNIT/ML injection Comments:   Reason for Stopping:         ondansetron (ZOFRAN) 4 MG tablet Comments:   Reason for Stopping:             Allergies   Allergies   Allergen Reactions      Oxycodone Nausea and Vomiting     Vicodin [Hydrocodone-Acetaminophen] Nausea and Vomiting       Therese Campbell MD

## 2021-07-02 NOTE — PLAN OF CARE
Pt. oriented to the room and call light system,family book,safety,blood sugar checks & feeding for pt & . VSS. Tylenol (declined ibuprofen) providing adequate pain control for perineal discomfort. Also using ice and tucks pads.Lower back sore & bruised @ epidural site, ice pack & heat pack given. Capo. Reg diet.Blood sugar @ HS 98. Voiding. S.L removed. Breast fed well then attempt. FF HDM 12 ml to  to maintain blood sugars.

## 2021-07-02 NOTE — ANESTHESIA POSTPROCEDURE EVALUATION
Patient: Stacey Serna    * No procedures listed *    Diagnosis:* No pre-op diagnosis entered *  Diagnosis Additional Information: No value filed.    Anesthesia Type:  Epidural    Note:  Disposition: Admission   Postop Pain Control: Uneventful            Sign Out: Well controlled pain   PONV: No   Neuro/Psych: Uneventful            Sign Out: Acceptable/Baseline neuro status   Airway/Respiratory: Uneventful            Sign Out: Acceptable/Baseline resp. status   CV/Hemodynamics: Uneventful            Sign Out: Acceptable CV status; No obvious hypovolemia; No obvious fluid overload   Other NRE: NONE   DID A NON-ROUTINE EVENT OCCUR? No    Event details/Postop Comments:  Patient doing well per nursing notes. Patient discharged prior to encounter. No headaches, low back pain or residual numbness/paresthesias per notes. No anesthetic complications.              Last vitals:  Vitals:    07/01/21 2000 07/02/21 0000 07/02/21 0851   BP: 118/60 118/72 139/87   Pulse: 80 92 81   Resp: 18 18 18   Temp: 36.7  C (98  F) 36.7  C (98  F) 36.5  C (97.7  F)   SpO2:          Last vitals prior to Anesthesia Care Transfer:      Electronically Signed By: Iris Aleman MD  July 2, 2021  6:08 PM

## 2021-07-02 NOTE — PROVIDER NOTIFICATION
Called Dr. Simon regarding fasting  BGM of 107. Dr. Campbell will round this morning to set parameters of glucose monitoring.

## 2021-07-02 NOTE — PLAN OF CARE
Vital signs stable, afebrile, voiding well, ice and tucks to perineum prn, FFU/1 scant rubra lochia, able to ambulate without dizziness, able to rest, pain well controlled with medications, rates her pain 3/10, breast feeding  skin-to-skin and pumping for EBM. Supplementing with donor milk after each feeding but has now decided to use formula by bottle. She said that she plans to pump and bottle EBM and formula to her  when she goes home.  is here and is supportive. AM fasting BGM.

## 2021-10-24 ENCOUNTER — HEALTH MAINTENANCE LETTER (OUTPATIENT)
Age: 31
End: 2021-10-24

## 2022-10-15 ENCOUNTER — HEALTH MAINTENANCE LETTER (OUTPATIENT)
Age: 32
End: 2022-10-15

## 2022-12-03 ENCOUNTER — HEALTH MAINTENANCE LETTER (OUTPATIENT)
Age: 32
End: 2022-12-03

## 2023-10-13 NOTE — TELEPHONE ENCOUNTER
10/13/2023         RE: Eloisa Rene  [address] [place] [address]        Dear Colleague,    Thank you for referring your patient, Eloisa Rene, to the [facility]. Please see a copy of my visit note below.    - Endocrinology Follow up -    Reason for visit/consult:  Elevated cortisol level    Primary care provider: Clinic, [facility]      Assessment and Plan  19 year old female with elevated cortisol level.    Suspected Cushing's disease- There are some findings which suspect for Cushing's disease however still biochemically inconclusive. ACTH was 94 elevated in 11/2022 and never suppressed, so if exists, will be ACTH dependent. MRI 7T done read as negative but may have some hypoenhanced lesion? Therefore we sent her to [facility] in 2/2023, [facility] showed no elevated of ACTH at all entire course. At this point, there is not enough evidence of ACTH dependent Cushing's disease. However she multiple symptoms including weakness, fatigue, dizziness, light sensitivity, anxiety, all of which significantly affecting her quality of life. Meanwhile we did other tests to rule out PCOS, showed SHBG low, and slight high normal tesotsterone level. Overall, she has had some cortisol testing that has resulted normal or high normal that are not definite of Cushing's disease but does present with many of the symptoms and physical manifestations of the disease. Therefore, plan on doing follow-up testing listed below today and discussed with the patient starting a 100 mg dose of ketoconazole to trial for a few months if her tests continue to come back as high normal to see if this provides symptomatic relief. Discussed with patient side effects of adrenal crisis and low cortisol.         Plan:  - Repeat salivary cortisol, 24 hour urine cortisol, serum cortisol, testosterone, estradiol, ACTH    - repeat cortisone dexamethasone suppression test, 24 hour UFC, salivary cortisol  - prescribing 100  Patient had laparoscopic cholecystectomy with Dr. Cuevas on 5/7/19.    She reports a burning sensation in the surgical area.  She says it almost feels like muscle pain, but is completely resolved when she lays flat.  She has not been utilizing any interventions to aid in relief.    Encouraged her to try alternating ice and heat along with taking ibuprofen and gentle massage to the area.  If symptoms persist or become worse in the following 1-2 weeks, she should call and schedule a post operative visit.    She agreed.    Sofia Mobley RN-BSN     mg ketoconazole daily. If any one of them eleavated, then we can try ketoconazole 100 mg daily for a few month.     Discussed with patient to consider taking if cortisol values come back at high normal range due to suffering from symptoms    - after the result, and ketoconazole, then we will communicate with Dr. Jai Pimentel, MS3  Medical Student     Attending tie-in note  I saw the patient with Margarito BOWMAN3 and directly examined patient and discussed. Agree above note and plan.       35 minutes spent on the date of the encounter doing chart review, history and exam, documentation and further activities as noted above.    Daisy Lucero MD  Staff Physician  Endocrinology and Metabolism  [facility]  License: [id number]  Pager: [phone]         Summary of Cortisol testing:  - Salivary Cortisol Testing  11/03/2022- cortisol salvia <50  11/15/2022 - coritsol saliva - <0.012  12/01/1011 - cortisol saliva - 0.030      - Urinary Cortisol Testing  10/10/2022 - Cortisol urine 24 hour - 40 (3.5-45 mcg)/24 hr  10/13/2022 - cortisol urine 24 hour - 55 (3.5 -45 mcg)/24 hour - high  11/15/2022 - cortisol free urine 16.50 micrograms/L       - 1mg/8mg Dexamethasone suppression testing  10/06/2022 - Cortisol (serum?) 29.36 (3.44 - 22.45 micrograms/dl)  10/14/2022 - cortisol serum 7.10 (3.44-22.45 micrograms/dL)  10/17/2022- cortisol serum 0.61 (3.44-22.45 micrograms/dL)  10/19/2022 - cortisol 2.24 (3.44 -22.45 micrograms/dL)  11/14/2022 - cortisol serum 19.7    - random cortisol ACTH  10/06/2022 - ACTH  178 (high)  10/19/2022 - ACTH - 18 (normal)      Interval History as of 10/13/2023: Patient endorses same symptoms of weakness, fatigue, dizziness, light sensitivity, and anxiety all of which are greatly affecting her quality of life. Patient had taken metformin for a few months after the last visit but stopped due to GI issues. Over the last year, she has been working with a neurologist   Jai at [place] and was diagnosed with POTS and small fiber neuropathy which has been hypothesized to be from her suspected Cushing's.   Interval History as of 3/24/2023 : Patient has been not feeling well, she has multiple symptoms including weakness, fatigue, dizziness, light sensitivity, anxiety, all of which significantly affecting her quality of life. BP has been higher side. she also mentioned some possible lactation from her right nipple recently.  HPI: A 20 yo female here for the second opinion of hypercortisolemia. Referral from Dr. Childs.   Patient father accompanied the session.  Patient described since April 2022 she started to feel multiple weird symptoms.  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  She described she feel intermittently more, some vibration sensation inside of her body.  Also she mentioned some palpitations, especially when she is resting at night.  Symptom alleviated after multiple eating of salt and crackers.  She has polyuria started April 2022 she said approximately 4 times a night.  Also she mentioned she has vertigo quite often and she described she feels off balance.  Regarding her palpitation she also completed cardiology work-up in end of summer 2022 including 7 days monitoring and average heart rate was 87 and she was told possible sinus tachycardia without any remarkable arrhythmia.   She recalls since February 2021, she has repeated symptoms of urinary tract infection. Urine culture results 4 times and each time bacteria negative.   She also mentioned she experiences episode of right side of the eye and cheek drop but no numbness and the last few hours, she has been experience this episode approximately twice a week.   She also mentioned she had a significant weight gain since April 2022 she recalls her body weight is around 160s and today her body weight is 195  "pounds.  She already visited multiple specialties primary doctor, cardiology, she was referred to endocrinologist Dr. Childs, where hypercholesterolemia work-up has been done intensively.   Random cortisol 10:00 in the morning was 29 with ACTH 178.  She underwent dexamethasone test 3 times.  First around 1 mg dexamethasone suppression test was 7.1 elevated, on October 17, 2022 she underwent 8 mg dexamethasone suppression test which was appropriately suppressed at 0.61.  She was repeated 1 mg dexamethasone suppression test on October 19, 2022 which shows borderline suppressed 2.24.  She underwent urinary free cortisol 24-hour test initially  October 10, 2022 was 40 with upper limit of 45, second test was done October 13, 2022 which has mildly elevated to 55.  She underwent salivary cortisol test to twice in the role first test was on November 2, 2022 and second test was November 30, 2022, she kept a 7 pound 12 reiterate her\" altogether and both of which normal range.   She also underwent brain MRI on October 28, 2022 which was read as normal no significant pituitary gland.   She also mentioned that her blood pressure has been mildly elevated recently and today 137/84 with pulse 82.  She also mentioned her glucose mildly elevated on October 6, 2022 fasting glucose was 102 .  Her prolactin level has been mildly elevated 30s-40.  Her menarche age 14 her menstrual cycle has been recently 28 to 35 days cycle she recently on birth control pills since July 2021 .  Denied hirsutism .  She also has depression and panic attack for 1.5 years but she described past several months get worse  .she came here for the second opinion today.   She also described her weight gain she noticed more abdominal fat accumulation.  She also mentioned difficult healing from minor injury.  She has some few acnes on her face.  She also mentioned she noticed some faint red stria around her stomach and axillary area.       Encounters  - from Last 3 " Months    Past Medical/Surgical History:  No past medical history on file.  Past Surgical History:   Procedure Laterality Date     IR CAROTID CEREBRAL ANGIOGRAM BILATERAL  2/15/2023       Allergies:  Allergies   Allergen Reactions     Amphetamine-Dextroamphet Er Other (See Comments)     She did not feel well while taking      Desogestrel-Ethinyl Estradiol Unknown     Drospirenone-Ethinyl Estradiol Other (See Comments)     Suicidal thoughts      Fluoxetine Other (See Comments)     Suicidal thoughts        Current Medications   Current Outpatient Medications   Medication     dexAMETHasone (DECADRON) 1 MG tablet     ibuprofen (ADVIL/MOTRIN) 200 MG tablet     ketoconazole (NIZORAL) 200 MG tablet     VITAMIN D PO     diazepam (VALIUM) 5 MG tablet     metFORMIN (GLUCOPHAGE) 500 MG tablet     No current facility-administered medications for this visit.       Family History:  Family History   Problem Relation Age of Onset     Congenital Anomalies Brother         Down's Syndrome       Social History:  Social History     Tobacco Use     Smoking status: Never     Smokeless tobacco: Never   Substance Use Topics     Alcohol use: Not on file       ROS:  Full review of systems taken with the help of the intake sheet. Otherwise a complete 14 point review of systems was taken and is negative unless stated in the history above.      Physical Exam:   Vitals: /77 (BP Location: Left arm, Patient Position: Sitting, Cuff Size: Adult Regular)   Pulse (!) 121   Wt 92.1 kg (203 lb)   LMP 10/05/2023 (Exact Date)   SpO2 100%   BMI 29.34 kg/m    BMI= Body mass index is 29.34 kg/m .   General: well appearing, no acute distress, pleasant and conversant  Mental Status/neuro: alert and oriented  Face: symmetrical, pink facial color, no hirsutism  Eyes: anicteric,, no proptosis or lid lag  Resp: normally breathing  Thighs: thin purple stretch marks  Legs: no swelling or edema        Labs : I reviewed data from epic and extract and  summarize the pertinent data here.     - overview of cortisol testing is under interval history    Lab on 09/15/2023   Component Date Value Ref Range Status     See Scanned Result 09/15/2023 Specimen received. Reordered and sent to performing laboratory. Report to follow up on completion.   Final     Performing Laboratory 09/15/2023 "[facility]   Final     Test Name 09/15/2023 Sensory Neuropathy antibody panel with reflex to titer and neuronal immublot   Final     Test Code 09/15/2023 [id number]   Final     Vasopressin 09/15/2023 3.7  0.0 - 6.9 pg/mL Final    INTERPRETIVE INFORMATION: Arginine Vasopressin Hormone    This test was developed and its performance characteristics   determined by "[facility]. It has not been cleared or   approved by the US Food and Drug Administration. This test   was performed in a CLIA certified laboratory and is   intended for clinical purposes.  Performed By: "[facility]  [address] [place] [id number]  : Cal Bosch MD, PhD  CLIA Number: [id number]     DNA (ds) Antibody 09/15/2023 3.1  <10.0 IU/mL Final    Negative     RNA Polymerase III Antibody, IgG 09/15/2023 3  0 - 19 Units Final    Comment: INTERPRETIVE INFORMATION: RNA Polymerase III Antibody, IgG      19 Units or less ......Negative    20 - 39 Units .........Weak Positive    40 - 80 Units .........Moderate Positive    81 Units or greater ...Strong Positive    The presence of RNA polymerase III IgG antibody, when   considered in conjunction with other laboratory and   clinical findings, is an aid in the diagnosis of systemic   sclerosis (SSc) with increased incidence of skin   involvement and renal crisis with the diffuse cutaneous   form of SSc. RNA polymerase III IgG antibody occur in about   11-23 percent of SSc patients, and typically in the absence   of anti-centromere and anti-Scl-70 antibodies.    A negative result indicates no detectable IgG antibodies to   the  dominant antigen of RNA polymerase III and does not   rule out the possibility of SSc. False-positive results may   also occur due to non-specific binding of immune complexes.   Strong clinical correlation is recommended.    If clinical suspicion remains,                            consider additional testing   for other antibodies associated with SSc, including   centromere, Scl-70, U3-RNP, PM/Scl, or Th/To.  Performed By: [facility]  [address] [place] [id number]  : Cal Bosch MD, PhD  CLIA Number: [id number]     Centromere Vandana IgG Instrument Value 09/15/2023 <0.7  <7.0 U/mL Final     Centromere Antibody IgG 09/15/2023 Negative  Negative Final     Scl-70 Vandana IgG Instrument Value 09/15/2023 <0.6  <7.0 U/mL Final     Scl-70 Antibody IgG 09/15/2023 Negative  Negative Final     RNP Vandana IgG Instrument Value 09/15/2023 1.4  <5.0 U/mL Final     RNP Antibody IgG 09/15/2023 Negative  Negative Final     Floyd DAISY Vandana IgG Instrument Value 09/15/2023 <0.7  <7.0 U/mL Final     Floyd DAISY Antibody IgG 09/15/2023 Negative  Negative Final     SSA Vandana IgG Instrument Value 09/15/2023 <0.5  <7.0 U/mL Final     SSA (Ro) Antibody IgG 09/15/2023 Negative  Negative Final     SSB Vandana IgG Instrument Value 09/15/2023 <0.6  <7.0 U/mL Final     SSB (La) Antibody IgG 09/15/2023 Negative  Negative Final     RAFAEL interpretation 09/15/2023 Negative  Negative Final      Negative:              <1:40  Borderline Positive:   1:40 - 1:80  Positive:              >1:80     Immunoglobulin M 09/15/2023 185  35 - 242 mg/dL Final     Immunoglobulin A 09/15/2023 353  84 - 499 mg/dL Final     Immunoglobulin G 09/15/2023 1,144  610 - 1,616 mg/dL Final     Immunofixation ELP 09/15/2023 No monoclonal protein seen on immunofixation. Pathologic significance requires clinical correlation. BERNARD Ruff M.D., Ph.D., Pathologist   Final     Miscellaneous Test 09/15/2023 SEE NOTE   Final    Comment: Test name                     Result Flag  Units  RefIntvl  ------------------------------------------------------------  Purkinje Cell/Neuronal Nuclear IgG Scrn                        None Detected             None Detected    SAM-1, SAM-2, PCCA-1 or PCCA-Tr(DNER) antibodies not   detected. No further testing will be performed.  INTERPRETIVE INFORMATION: Purkinje Cell/Neuronal Nuclear   IgG Scrn    This test was developed and its performance characteristics   determined by [email or web address]. It has not been cleared or   approved by the US Food and Drug Administration. This test   was performed in a CLIA certified laboratory and is   intended for clinical purposes.  SGPG Antibody, IgM                                0.20          IV 0.00-0.99      INTERPRETIVE INFORMATION: SGPG Antibody, IgM    The majority of sulfate-3-glucuronyl paragloboside (SGPG)   IgM-positive  sera will show reactivity against MAG. Patients who are   SGPG IgM  positive and MAG IgM negative may have multi-focal                            motor   neuropathy  with conduction block.    This test was developed and its performance characteristics   determined by [email or web address]. It has not been cleared or   approved by the US Food and Drug Administration. This test   was performed in a CLIA certified laboratory and is   intended for clinical purposes.  MAG Antibody, IgM Mellissa                               <1000          TU 0-999          INTERPRETIVE INFORMATION: MAG Antibody, IgM MELLISSA    An elevated IgM antibody concentration greater than 999 TU   against myelin-associated glycoprotein (MAG) suggests   active demyelination in peripheral neuropathy. A normal   concentration (less than 999 TU) generally rules out an   anti-MAG antibody-associated peripheral neuropathy.    TU=Titer Units    This test was developed and its performance characteristics   determined by [email or web address]. It has not been cleared or   approved by the US Food and Drug Administration. This test    was performed in a CLIA certified laboratory and is                              intended for clinical purposes.  Performed By: [facility]  [address] [place] [address]  : Cal Bosch MD, PhD  CLIA Number: [id number]      Lab Results   Component Value Date    WBC 6.1 02/15/2023    HGB 13.6 02/15/2023    HCT 42.6 02/15/2023     02/15/2023     03/24/2023     02/15/2023    POTASSIUM 4.2 03/24/2023    POTASSIUM 4.3 02/15/2023    CHLORIDE 108 03/24/2023    CHLORIDE 106 02/15/2023    CO2 31 03/24/2023    CO2 24 02/15/2023    BUN 13 03/24/2023    BUN 13.8 02/15/2023    CR 0.61 03/24/2023    CR 0.61 02/15/2023     (H) 03/24/2023     (H) 02/15/2023    AST 14 03/24/2023    ALT 24 03/24/2023    ALKPHOS 124 03/24/2023    BILITOTAL 0.4 03/24/2023    INR 1.02 02/15/2023          MRI Brain: I personally reviewed the original images and agree with the below reports.   Reviewed.           Answers submitted by the patient for this visit:  Symptoms you have experienced in the last 30 days (Submitted on 10/12/2023)  General Symptoms: Yes  Skin Symptoms: Yes  HENT Symptoms: Yes  EYE SYMPTOMS: No  HEART SYMPTOMS: Yes  LUNG SYMPTOMS: Yes  INTESTINAL SYMPTOMS: Yes  URINARY SYMPTOMS: Yes  GYNECOLOGIC SYMPTOMS: Yes  BREAST SYMPTOMS: No  SKELETAL SYMPTOMS: Yes  BLOOD SYMPTOMS: No  NERVOUS SYSTEM SYMPTOMS: Yes  MENTAL HEALTH SYMPTOMS: No  Please answer the questions below to tell us what conditions you are experiencing: (Submitted on 10/12/2023)  Ear pain: No  Ear discharge: No  Hearing loss: No  Tinnitus: Yes  Nosebleeds: No  Congestion: No  Sinus pain: No  Trouble swallowing: No   Voice hoarseness: No  Mouth sores: No  Sore throat: No  Tooth pain: No  Gum tenderness: No  Bleeding gums: No  Change in taste: No  Change in sense of smell: No  Dry mouth: No  Hearing aid used: No  Neck lump: No  Please answer the questions below to tell us what conditions you are  experiencing: (Submitted on 10/12/2023)  Fever: No  Loss of appetite: No  Weight loss: No  Weight gain: No  Fatigue: Yes  Night sweats: No  Chills: No  Increased stress: No  Excessive hunger: Yes  Excessive thirst: No  Feeling hot or cold when others believe the temperature is normal: Yes  Loss of height: No  Post-operative complications: No  Surgical site pain: No  Hallucinations: No  Change in or Loss of Energy: Yes  Hyperactivity: No  Confusion: No   (Submitted on 10/12/2023)  Changes in hair: Yes  Changes in moles/birth marks: No  Itching: Yes  Rashes: No  Changes in nails: No  Acne: Yes  Hair in places you don't want it: Yes  Change in facial hair: Yes  Warts: No  Non-healing sores: Yes  Scarring: Yes  Flaking of skin: No  Color changes of hands/feet in cold : Yes  Sun sensitivity: Yes  Skin thickening: No  Please answer the questions below to tell us what condition you are experiencing: (Submitted on 10/12/2023)  Chest pain or pressure: No  Fast or irregular heartbeat: Yes  Pain in legs with walking: No  Trouble breathing while lying down: Yes  Fingers or toes appear blue: Yes  High blood pressure: No  Low blood pressure: No  Fainting: No  Murmurs: No  Pacemaker: No  Varicose veins: No  Edema or swelling: No  Wake up at night with shortness of breath: No  Light-headedness: Yes  Exercise intolerance: Yes  Please answer the questions below to tell us what condition you are experiencing: (Submitted on 10/12/2023)  Cough: No  Sputum or phlegm: No  Coughing up blood: No  Difficulty breating or shortness of breath: Yes  Snoring: No  Wheezing: No  Difficulty breathing on exertion: Yes  Nighttime Cough: No  Difficulty breathing when lying flat: Yes  Please answer the questions below to tell us what conditions you are experiencing: (Submitted on 10/12/2023)  Heart burn or indigestion: Yes  Nausea: Yes  Vomiting: No  Abdominal pain: No  Bloating: Yes  Constipation: No  Diarrhea: No  Blood in stool: No  Black stools:  No  Rectal or Anal pain: No  Fecal incontinence: No  Yellowing of skin or eyes: No  Vomit with blood: No  Change in stools: No  Please answer the questions below to tell us what condition you are experiencing: (Submitted on 10/12/2023)  Trouble holding urine or incontinence: No  Pain or burning: No  Trouble starting or stopping: No  Increased frequency of urination: Yes  Blood in urine: No  Decreased frequency of urination: No  Frequent nighttime urination: Yes  Flank pain: No  Difficulty emptying bladder: No  Please answer the questions below to tell us what condition you are experiencing: (Submitted on 10/12/2023)  Bleeding or spotting between periods: No  Heavy or painful periods: Yes  Irregular periods: Yes  Vaginal discharge: No  Hot flashes: Yes  Vaginal dryness: No  Genital ulcers: No  Reduced libido: Yes  Painful intercourse: No  Difficulty with sexual arousal: No  Post-menopausal bleeding: No  Please answer the questions below to tell us what condition you are experiencing: (Submitted on 10/12/2023)  Back pain: Yes  Muscle aches: Yes  Neck pain: No  Swollen joints: No  Joint pain: Yes  Bone pain: No  Muscle cramps: No  Muscle weakness: Yes  Joint stiffness: No  Bone fracture: No  Please answer the questions below to tell us what condition you are experiencing: (Submitted on 10/12/2023)  Trouble with coordination: No  Dizziness or trouble with balance: Yes  Fainting or black-out spells: No  Memory loss: No  Headache: Yes  Seizures: No  Speech problems: No  Tingling: Yes  Tremor: Yes  Weakness: Yes  Difficulty walking: No  Paralysis: No  Numbness: No      Again, thank you for allowing me to participate in the care of your patient.        Sincerely,        Daisy Lucero MD

## 2024-05-26 ENCOUNTER — HEALTH MAINTENANCE LETTER (OUTPATIENT)
Age: 34
End: 2024-05-26

## (undated) DEVICE — ENDO TROCAR FIRST ENTRY KII FIOS Z-THRD 11X100MM CTF33

## (undated) DEVICE — ENDO POUCH UNIV RETRIEVAL SYSTEM INZII 10MM CD001

## (undated) DEVICE — GLOVE GAMMEX DERMAPRENE ULTRA SZ 8.5 LF 8517

## (undated) DEVICE — SU VICRYL 4-0 PS-2 18" UND J496H

## (undated) DEVICE — ENDO TROCAR FIRST ENTRY KII FIOS Z-THRD 05X100MM CTF03

## (undated) DEVICE — ESU HOLDER LAP INST DISP PURPLE LONG 330MM H-PRO-330

## (undated) DEVICE — PACK LAP CHOLE SLC15LCFSD

## (undated) DEVICE — ESU CORD MONOPOLAR 10'  E0510

## (undated) DEVICE — PREP CHLORAPREP 26ML TINTED ORANGE  260815

## (undated) DEVICE — LINEN TOWEL PACK X5 5464

## (undated) DEVICE — ENDO TROCAR SLEEVE KII Z-THREADED 05X100MM CTS02

## (undated) DEVICE — SOL NACL 0.9% INJ 1000ML BAG 2B1324X

## (undated) DEVICE — SUCTION IRR STRYKERFLOW II W/TIP 250-070-520

## (undated) DEVICE — SU VICRYL 0 UR-6 27" J603H

## (undated) DEVICE — SYSTEM CLEARIFY VISUALIZATION 21-345

## (undated) DEVICE — SUCTION CANISTER MEDIVAC LINER 3000ML W/LID 65651-530

## (undated) DEVICE — SOL WATER IRRIG 1000ML BOTTLE 2F7114

## (undated) RX ORDER — TRAMADOL HYDROCHLORIDE 50 MG/1
TABLET ORAL
Status: DISPENSED
Start: 2019-05-07

## (undated) RX ORDER — FENTANYL CITRATE 50 UG/ML
INJECTION, SOLUTION INTRAMUSCULAR; INTRAVENOUS
Status: DISPENSED
Start: 2019-05-07

## (undated) RX ORDER — GLYCOPYRROLATE 0.2 MG/ML
INJECTION, SOLUTION INTRAMUSCULAR; INTRAVENOUS
Status: DISPENSED
Start: 2019-05-07

## (undated) RX ORDER — KETOROLAC TROMETHAMINE 30 MG/ML
INJECTION, SOLUTION INTRAMUSCULAR; INTRAVENOUS
Status: DISPENSED
Start: 2019-05-07

## (undated) RX ORDER — HYDROMORPHONE HYDROCHLORIDE 1 MG/ML
INJECTION, SOLUTION INTRAMUSCULAR; INTRAVENOUS; SUBCUTANEOUS
Status: DISPENSED
Start: 2019-05-07

## (undated) RX ORDER — DEXAMETHASONE SODIUM PHOSPHATE 4 MG/ML
INJECTION, SOLUTION INTRA-ARTICULAR; INTRALESIONAL; INTRAMUSCULAR; INTRAVENOUS; SOFT TISSUE
Status: DISPENSED
Start: 2019-05-07

## (undated) RX ORDER — CEFAZOLIN SODIUM 2 G/100ML
INJECTION, SOLUTION INTRAVENOUS
Status: DISPENSED
Start: 2019-05-07

## (undated) RX ORDER — ONDANSETRON 2 MG/ML
INJECTION INTRAMUSCULAR; INTRAVENOUS
Status: DISPENSED
Start: 2019-05-07

## (undated) RX ORDER — LIDOCAINE HYDROCHLORIDE 20 MG/ML
INJECTION, SOLUTION EPIDURAL; INFILTRATION; INTRACAUDAL; PERINEURAL
Status: DISPENSED
Start: 2019-05-07

## (undated) RX ORDER — NEOSTIGMINE METHYLSULFATE 1 MG/ML
VIAL (ML) INJECTION
Status: DISPENSED
Start: 2019-05-07

## (undated) RX ORDER — PROPOFOL 10 MG/ML
INJECTION, EMULSION INTRAVENOUS
Status: DISPENSED
Start: 2019-05-07